# Patient Record
Sex: FEMALE | Race: WHITE | NOT HISPANIC OR LATINO
[De-identification: names, ages, dates, MRNs, and addresses within clinical notes are randomized per-mention and may not be internally consistent; named-entity substitution may affect disease eponyms.]

---

## 2018-04-09 ENCOUNTER — APPOINTMENT (OUTPATIENT)
Dept: SPINE | Facility: CLINIC | Age: 58
End: 2018-04-09
Payer: COMMERCIAL

## 2018-04-09 VITALS
HEIGHT: 65 IN | HEART RATE: 60 BPM | DIASTOLIC BLOOD PRESSURE: 91 MMHG | SYSTOLIC BLOOD PRESSURE: 140 MMHG | BODY MASS INDEX: 29.99 KG/M2 | WEIGHT: 180 LBS

## 2018-04-09 DIAGNOSIS — Z86.79 PERSONAL HISTORY OF OTHER DISEASES OF THE CIRCULATORY SYSTEM: ICD-10-CM

## 2018-04-09 DIAGNOSIS — Z87.898 PERSONAL HISTORY OF OTHER SPECIFIED CONDITIONS: ICD-10-CM

## 2018-04-09 DIAGNOSIS — M51.36 OTHER INTERVERTEBRAL DISC DEGENERATION, LUMBAR REGION: ICD-10-CM

## 2018-04-09 DIAGNOSIS — Z80.9 FAMILY HISTORY OF MALIGNANT NEOPLASM, UNSPECIFIED: ICD-10-CM

## 2018-04-09 DIAGNOSIS — Z83.3 FAMILY HISTORY OF DIABETES MELLITUS: ICD-10-CM

## 2018-04-09 DIAGNOSIS — M51.9 UNSPECIFIED THORACIC, THORACOLUMBAR AND LUMBOSACRAL INTERVERTEBRAL DISC DISORDER: ICD-10-CM

## 2018-04-09 DIAGNOSIS — Z82.49 FAMILY HISTORY OF ISCHEMIC HEART DISEASE AND OTHER DISEASES OF THE CIRCULATORY SYSTEM: ICD-10-CM

## 2018-04-09 DIAGNOSIS — F17.200 NICOTINE DEPENDENCE, UNSPECIFIED, UNCOMPLICATED: ICD-10-CM

## 2018-04-09 PROCEDURE — 99205 OFFICE O/P NEW HI 60 MIN: CPT

## 2018-04-09 RX ORDER — DULOXETINE HYDROCHLORIDE 30 MG/1
CAPSULE, DELAYED RELEASE ORAL
Refills: 0 | Status: ACTIVE | COMMUNITY

## 2018-04-16 ENCOUNTER — APPOINTMENT (OUTPATIENT)
Dept: RADIOLOGY | Facility: CLINIC | Age: 58
End: 2018-04-16

## 2018-04-16 ENCOUNTER — APPOINTMENT (OUTPATIENT)
Dept: CT IMAGING | Facility: CLINIC | Age: 58
End: 2018-04-16

## 2018-05-23 ENCOUNTER — RX RENEWAL (OUTPATIENT)
Age: 58
End: 2018-05-23

## 2018-06-01 ENCOUNTER — APPOINTMENT (OUTPATIENT)
Dept: SPINE | Facility: CLINIC | Age: 58
End: 2018-06-01
Payer: COMMERCIAL

## 2018-06-01 VITALS
WEIGHT: 180 LBS | HEART RATE: 71 BPM | BODY MASS INDEX: 29.99 KG/M2 | SYSTOLIC BLOOD PRESSURE: 115 MMHG | DIASTOLIC BLOOD PRESSURE: 79 MMHG | HEIGHT: 65 IN

## 2018-06-01 PROCEDURE — 99213 OFFICE O/P EST LOW 20 MIN: CPT

## 2018-06-05 ENCOUNTER — OUTPATIENT (OUTPATIENT)
Dept: OUTPATIENT SERVICES | Facility: HOSPITAL | Age: 58
LOS: 1 days | End: 2018-06-05
Payer: COMMERCIAL

## 2018-06-05 VITALS
DIASTOLIC BLOOD PRESSURE: 82 MMHG | HEIGHT: 64 IN | TEMPERATURE: 98 F | RESPIRATION RATE: 18 BRPM | OXYGEN SATURATION: 99 % | HEART RATE: 64 BPM | SYSTOLIC BLOOD PRESSURE: 122 MMHG | WEIGHT: 175.05 LBS

## 2018-06-05 DIAGNOSIS — M47.816 SPONDYLOSIS WITHOUT MYELOPATHY OR RADICULOPATHY, LUMBAR REGION: ICD-10-CM

## 2018-06-05 DIAGNOSIS — Z98.890 OTHER SPECIFIED POSTPROCEDURAL STATES: Chronic | ICD-10-CM

## 2018-06-05 DIAGNOSIS — Z01.818 ENCOUNTER FOR OTHER PREPROCEDURAL EXAMINATION: ICD-10-CM

## 2018-06-05 DIAGNOSIS — Z29.9 ENCOUNTER FOR PROPHYLACTIC MEASURES, UNSPECIFIED: ICD-10-CM

## 2018-06-05 DIAGNOSIS — F41.9 ANXIETY DISORDER, UNSPECIFIED: ICD-10-CM

## 2018-06-05 DIAGNOSIS — T41.45XA ADVERSE EFFECT OF UNSPECIFIED ANESTHETIC, INITIAL ENCOUNTER: ICD-10-CM

## 2018-06-05 LAB
ANION GAP SERPL CALC-SCNC: 12 MMOL/L — SIGNIFICANT CHANGE UP (ref 5–17)
BLD GP AB SCN SERPL QL: NEGATIVE — SIGNIFICANT CHANGE UP
BUN SERPL-MCNC: 22 MG/DL — SIGNIFICANT CHANGE UP (ref 7–23)
CALCIUM SERPL-MCNC: 9.8 MG/DL — SIGNIFICANT CHANGE UP (ref 8.4–10.5)
CHLORIDE SERPL-SCNC: 101 MMOL/L — SIGNIFICANT CHANGE UP (ref 96–108)
CO2 SERPL-SCNC: 26 MMOL/L — SIGNIFICANT CHANGE UP (ref 22–31)
CREAT SERPL-MCNC: 0.76 MG/DL — SIGNIFICANT CHANGE UP (ref 0.5–1.3)
GLUCOSE SERPL-MCNC: 91 MG/DL — SIGNIFICANT CHANGE UP (ref 70–99)
HCT VFR BLD CALC: 41.7 % — SIGNIFICANT CHANGE UP (ref 34.5–45)
HGB BLD-MCNC: 13.9 G/DL — SIGNIFICANT CHANGE UP (ref 11.5–15.5)
MCHC RBC-ENTMCNC: 30.1 PG — SIGNIFICANT CHANGE UP (ref 27–34)
MCHC RBC-ENTMCNC: 33.3 GM/DL — SIGNIFICANT CHANGE UP (ref 32–36)
MCV RBC AUTO: 90.3 FL — SIGNIFICANT CHANGE UP (ref 80–100)
MRSA PCR RESULT.: SIGNIFICANT CHANGE UP
PLATELET # BLD AUTO: 267 K/UL — SIGNIFICANT CHANGE UP (ref 150–400)
POTASSIUM SERPL-MCNC: 4.6 MMOL/L — SIGNIFICANT CHANGE UP (ref 3.5–5.3)
POTASSIUM SERPL-SCNC: 4.6 MMOL/L — SIGNIFICANT CHANGE UP (ref 3.5–5.3)
RBC # BLD: 4.62 M/UL — SIGNIFICANT CHANGE UP (ref 3.8–5.2)
RBC # FLD: 14.1 % — SIGNIFICANT CHANGE UP (ref 10.3–14.5)
RH IG SCN BLD-IMP: NEGATIVE — SIGNIFICANT CHANGE UP
S AUREUS DNA NOSE QL NAA+PROBE: SIGNIFICANT CHANGE UP
SODIUM SERPL-SCNC: 139 MMOL/L — SIGNIFICANT CHANGE UP (ref 135–145)
WBC # BLD: 4.82 K/UL — SIGNIFICANT CHANGE UP (ref 3.8–10.5)
WBC # FLD AUTO: 4.82 K/UL — SIGNIFICANT CHANGE UP (ref 3.8–10.5)

## 2018-06-05 PROCEDURE — 87641 MR-STAPH DNA AMP PROBE: CPT

## 2018-06-05 PROCEDURE — 86850 RBC ANTIBODY SCREEN: CPT

## 2018-06-05 PROCEDURE — 86901 BLOOD TYPING SEROLOGIC RH(D): CPT

## 2018-06-05 PROCEDURE — 87640 STAPH A DNA AMP PROBE: CPT

## 2018-06-05 PROCEDURE — G0463: CPT

## 2018-06-05 PROCEDURE — 85027 COMPLETE CBC AUTOMATED: CPT

## 2018-06-05 PROCEDURE — 80048 BASIC METABOLIC PNL TOTAL CA: CPT

## 2018-06-05 PROCEDURE — 86900 BLOOD TYPING SEROLOGIC ABO: CPT

## 2018-06-05 RX ORDER — CEFAZOLIN SODIUM 1 G
2000 VIAL (EA) INJECTION ONCE
Qty: 0 | Refills: 0 | Status: COMPLETED | OUTPATIENT
Start: 2018-06-12 | End: 2018-06-12

## 2018-06-05 RX ORDER — SODIUM CHLORIDE 9 MG/ML
3 INJECTION INTRAMUSCULAR; INTRAVENOUS; SUBCUTANEOUS EVERY 8 HOURS
Qty: 0 | Refills: 0 | Status: DISCONTINUED | OUTPATIENT
Start: 2018-06-12 | End: 2018-06-12

## 2018-06-05 RX ORDER — LIDOCAINE HCL 20 MG/ML
0.2 VIAL (ML) INJECTION ONCE
Qty: 0 | Refills: 0 | Status: COMPLETED | OUTPATIENT
Start: 2018-06-12 | End: 2018-06-12

## 2018-06-05 NOTE — H&P PST ADULT - PROBLEM SELECTOR PLAN 3
- case discuss with anesthesia ( Dr Andrade )  -will obtain records from 2011 ( [facility])  -pt will bring anesthesia records from [facility]

## 2018-06-05 NOTE — H&P PST ADULT - HISTORY OF PRESENT ILLNESS
57 year old female with history of  Anxiety, chronic low back pain s/p  L3-L4 lumbar laminectomy with removal of synovial cyst in 2018.  Pt. reports "stabbing" low back pain that radiates down the left leg with associated numbness and tingling.  She has received several epidural injections and had the lumbar cyst drained, without relief of pain. 57 year old female with history of  Anxiety, chronic low back pain s/p  L3-L4 lumbar laminectomy and fusion  in 2011. Pt has been c/o progressive  low back pain that radiates down the right leg with associated numbness and tingling for 3 moths followed by neurology tried pain medication without any relief s/p MRI revealed Junctional stenosis ,Grade 1 spondylolisthesis ,right synovial cyst . Presents to PST for scheduled Removal of Lumbar hardware  L2-5 decompression  L2- 3  L4 -5 posterior Lumbar Fusion  L2 - 5 Fusion on 6/12/2018.

## 2018-06-05 NOTE — H&P PST ADULT - PROBLEM SELECTOR PLAN 1
Removal of Lumbar hardware  L2-5 decompression  L2- 3  L4 -5 posterior Lumbar Fusion  L2 - 5 Fusion on 6/12/2018.  -Pre- Op instructions discussed  -labs sent

## 2018-06-05 NOTE — H&P PST ADULT - ACTIVITY
ADL's (activity limited over past 6 months due to pain) pt able walk ,daily activities, shopping ,light house work with back pain

## 2018-06-05 NOTE — H&P PST ADULT - ANESTHESIA, PREVIOUS REACTION, PROFILE
pt reports that she  had a anesthesia reaction with all the surgery (   couldn't breath, palpitations after anesthesia ) except t the knee surgery in last year at [place]/hypotension/irregular heartbeat/heart complications irregular heartbeat/pt reports that she has had a anesthesia reaction with all the surgery (   couldn't breath, palpitations after anesthesia ) except the knee surgery in last year at [place]/hypotension/heart complications irregular heartbeat/pt reports that she has had a anesthesia reaction with all the surgery (   couldn't breath, palpitations after anesthesia ) except the knee surgery in last year at [place]/hypotension

## 2018-06-05 NOTE — H&P PST ADULT - PSH
Excision of Scalp Cyst    H/O meniscectomy of right knee  2016  History of laminectomy  2011  Left Breast Cyst  1993- benign  Lumbar Synovial Cyst    S/P  Section  x3  S/P Dilatation and Curettage  1992 for miscarriage  S/P Laparoscopic Procedure  for Infertility

## 2018-06-05 NOTE — H&P PST ADULT - ASSESSMENT
CAPRINI SCORE [CLOT]    AGE RELATED RISK FACTORS                                                       MOBILITY RELATED FACTORS  x[ ] Age 41-60 years                                            (1 Point)                  [ ] Bed rest                                                        (1 Point)  [ ] Age: 61-74 years                                           (2 Points)                 [ ] Plaster cast                                                   (2 Points)  [ ] Age= 75 years                                              (3 Points)                 [ ] Bed bound for more than 72 hours                 (2 Points)    DISEASE RELATED RISK FACTORS                                               GENDER SPECIFIC FACTORS  [ ] Edema in the lower extremities                       (1 Point)                  [ ] Pregnancy                                                     (1 Point)  [ ] Varicose veins                                               (1 Point)                  [ ] Post-partum < 6 weeks                                   (1 Point)             [x ] BMI > 25 Kg/m2                                            (1 Point)                  [ ] Hormonal therapy  or oral contraception          (1 Point)                 [ ] Sepsis (in the previous month)                        (1 Point)                  [ ] History of pregnancy complications                 (1 point)  [ ] Pneumonia or serious lung disease                                               [ ] Unexplained or recurrent                     (1 Point)           (in the previous month)                               (1 Point)  [ ] Abnormal pulmonary function test                     (1 Point)                 SURGERY RELATED RISK FACTORS  [ ] Acute myocardial infarction                              (1 Point)                 [ ]  Section                                             (1 Point)  [ ] Congestive heart failure (in the previous month)  (1 Point)               [ ] Minor surgery                                                  (1 Point)   [ ] Inflammatory bowel disease                             (1 Point)                 [ ] Arthroscopic surgery                                        (2 Points)  [ ] Central venous access                                      (2 Points)                [x ] General surgery lasting more than 45 minutes   (2 Points)       [ ] Stroke (in the previous month)                          (5 Points)               [ ] Elective arthroplasty                                         (5 Points)                                                                                                                                               HEMATOLOGY RELATED FACTORS                                                 TRAUMA RELATED RISK FACTORS  [ ] Prior episodes of VTE                                     (3 Points)                 [ ] Fracture of the hip, pelvis, or leg                       (5 Points)  [ ] Positive family history for VTE                         (3 Points)                 [ ] Acute spinal cord injury (in the previous month)  (5 Points)  [ ] Prothrombin 18468 A                                     (3 Points)                 [ ] Paralysis  (less than 1 month)                             (5 Points)  [ ] Factor V Leiden                                             (3 Points)                  [ ] Multiple Trauma within 1 month                        (5 Points)  [ ] Lupus anticoagulants                                     (3 Points)                                                           [ ] Anticardiolipin antibodies                               (3 Points)                                                       [ ] High homocysteine in the blood                      (3 Points)                                             [ ] Other congenital or acquired thrombophilia      (3 Points)                                                [ ] Heparin induced thrombocytopenia                  (3 Points)                                          Total Score [    4      ]

## 2018-06-05 NOTE — H&P PST ADULT - PMH
Anxiety    Aortic Regurgitation  mild - as per last Echo -2016  Chronic Low Back Pain  since 10/2010  Lumbar Disc Disorder    MVP (Mitral Valve Prolapse)    Thyroid Nodule  benign- last thyroid BX 2010 Anxiety    Aortic Regurgitation  mild - as per last Echo -2016 -no change  Chronic Low Back Pain  since 10/2010  Lumbar Disc Disorder    MVP (Mitral Valve Prolapse)    Spondylosis without myelopathy or radiculopathy, lumbar region    Thyroid Nodule  benign- last thyroid BX 2014 follow endocrinologist  U/S every year

## 2018-06-05 NOTE — H&P PST ADULT - PSYCHIATRIC COMMENTS
Pt. anxious about surgery, all questions answered, emotional support provided. Pt. anxious about surgery, anesthesia, all questions answered, emotional support provided. pt will bring anesthesia  record from [facility]

## 2018-06-07 ENCOUNTER — RX RENEWAL (OUTPATIENT)
Age: 58
End: 2018-06-07

## 2018-06-08 ENCOUNTER — RX RENEWAL (OUTPATIENT)
Age: 58
End: 2018-06-08

## 2018-06-11 ENCOUNTER — TRANSCRIPTION ENCOUNTER (OUTPATIENT)
Age: 58
End: 2018-06-11

## 2018-06-12 ENCOUNTER — INPATIENT (INPATIENT)
Facility: HOSPITAL | Age: 58
LOS: 7 days | Discharge: HOME | DRG: 459 | End: 2018-06-20
Attending: NEUROLOGICAL SURGERY | Admitting: NEUROLOGICAL SURGERY
Payer: COMMERCIAL

## 2018-06-12 ENCOUNTER — APPOINTMENT (OUTPATIENT)
Dept: SPINE | Facility: HOSPITAL | Age: 58
End: 2018-06-12

## 2018-06-12 ENCOUNTER — RESULT REVIEW (OUTPATIENT)
Age: 58
End: 2018-06-12

## 2018-06-12 VITALS
SYSTOLIC BLOOD PRESSURE: 140 MMHG | DIASTOLIC BLOOD PRESSURE: 81 MMHG | TEMPERATURE: 98 F | HEIGHT: 64 IN | OXYGEN SATURATION: 99 % | HEART RATE: 59 BPM | RESPIRATION RATE: 16 BRPM | WEIGHT: 175.05 LBS

## 2018-06-12 DIAGNOSIS — M47.816 SPONDYLOSIS WITHOUT MYELOPATHY OR RADICULOPATHY, LUMBAR REGION: ICD-10-CM

## 2018-06-12 DIAGNOSIS — Z98.890 OTHER SPECIFIED POSTPROCEDURAL STATES: Chronic | ICD-10-CM

## 2018-06-12 LAB
ANION GAP SERPL CALC-SCNC: 11 MMOL/L — SIGNIFICANT CHANGE UP (ref 5–17)
ANION GAP SERPL CALC-SCNC: 11 MMOL/L — SIGNIFICANT CHANGE UP (ref 5–17)
ANION GAP SERPL CALC-SCNC: 14 MMOL/L — SIGNIFICANT CHANGE UP (ref 5–17)
BASOPHILS # BLD AUTO: 0 K/UL — SIGNIFICANT CHANGE UP (ref 0–0.2)
BASOPHILS NFR BLD AUTO: 0 % — SIGNIFICANT CHANGE UP (ref 0–2)
BUN SERPL-MCNC: 13 MG/DL — SIGNIFICANT CHANGE UP (ref 7–23)
BUN SERPL-MCNC: 14 MG/DL — SIGNIFICANT CHANGE UP (ref 7–23)
BUN SERPL-MCNC: 16 MG/DL — SIGNIFICANT CHANGE UP (ref 7–23)
CALCIUM SERPL-MCNC: 6.2 MG/DL — CRITICAL LOW (ref 8.4–10.5)
CALCIUM SERPL-MCNC: 6.7 MG/DL — LOW (ref 8.4–10.5)
CALCIUM SERPL-MCNC: 7.3 MG/DL — LOW (ref 8.4–10.5)
CHLORIDE SERPL-SCNC: 106 MMOL/L — SIGNIFICANT CHANGE UP (ref 96–108)
CHLORIDE SERPL-SCNC: 107 MMOL/L — SIGNIFICANT CHANGE UP (ref 96–108)
CHLORIDE SERPL-SCNC: 107 MMOL/L — SIGNIFICANT CHANGE UP (ref 96–108)
CO2 SERPL-SCNC: 21 MMOL/L — LOW (ref 22–31)
CO2 SERPL-SCNC: 23 MMOL/L — SIGNIFICANT CHANGE UP (ref 22–31)
CO2 SERPL-SCNC: 24 MMOL/L — SIGNIFICANT CHANGE UP (ref 22–31)
CREAT SERPL-MCNC: 0.67 MG/DL — SIGNIFICANT CHANGE UP (ref 0.5–1.3)
CREAT SERPL-MCNC: 0.68 MG/DL — SIGNIFICANT CHANGE UP (ref 0.5–1.3)
CREAT SERPL-MCNC: 0.77 MG/DL — SIGNIFICANT CHANGE UP (ref 0.5–1.3)
EOSINOPHIL # BLD AUTO: 0 K/UL — SIGNIFICANT CHANGE UP (ref 0–0.5)
EOSINOPHIL NFR BLD AUTO: 0.3 % — SIGNIFICANT CHANGE UP (ref 0–6)
GLUCOSE SERPL-MCNC: 144 MG/DL — HIGH (ref 70–99)
GLUCOSE SERPL-MCNC: 149 MG/DL — HIGH (ref 70–99)
GLUCOSE SERPL-MCNC: 193 MG/DL — HIGH (ref 70–99)
HCT VFR BLD CALC: 26.9 % — LOW (ref 34.5–45)
HCT VFR BLD CALC: 29.7 % — LOW (ref 34.5–45)
HCT VFR BLD CALC: 31.7 % — LOW (ref 34.5–45)
HGB BLD-MCNC: 10.6 G/DL — LOW (ref 11.5–15.5)
HGB BLD-MCNC: 11.1 G/DL — LOW (ref 11.5–15.5)
HGB BLD-MCNC: 9.4 G/DL — LOW (ref 11.5–15.5)
LYMPHOCYTES # BLD AUTO: 0.5 K/UL — LOW (ref 1–3.3)
LYMPHOCYTES # BLD AUTO: 4 % — LOW (ref 13–44)
MCHC RBC-ENTMCNC: 32 PG — SIGNIFICANT CHANGE UP (ref 27–34)
MCHC RBC-ENTMCNC: 32.1 PG — SIGNIFICANT CHANGE UP (ref 27–34)
MCHC RBC-ENTMCNC: 33 PG — SIGNIFICANT CHANGE UP (ref 27–34)
MCHC RBC-ENTMCNC: 34.9 GM/DL — SIGNIFICANT CHANGE UP (ref 32–36)
MCHC RBC-ENTMCNC: 35 GM/DL — SIGNIFICANT CHANGE UP (ref 32–36)
MCHC RBC-ENTMCNC: 35.7 GM/DL — SIGNIFICANT CHANGE UP (ref 32–36)
MCV RBC AUTO: 91.5 FL — SIGNIFICANT CHANGE UP (ref 80–100)
MCV RBC AUTO: 92 FL — SIGNIFICANT CHANGE UP (ref 80–100)
MCV RBC AUTO: 92.4 FL — SIGNIFICANT CHANGE UP (ref 80–100)
MONOCYTES # BLD AUTO: 0.1 K/UL — SIGNIFICANT CHANGE UP (ref 0–0.9)
MONOCYTES NFR BLD AUTO: 0.8 % — LOW (ref 2–14)
NEUTROPHILS # BLD AUTO: 11.4 K/UL — HIGH (ref 1.8–7.4)
NEUTROPHILS NFR BLD AUTO: 94.8 % — HIGH (ref 43–77)
PLATELET # BLD AUTO: 138 K/UL — LOW (ref 150–400)
PLATELET # BLD AUTO: 141 K/UL — LOW (ref 150–400)
PLATELET # BLD AUTO: 185 K/UL — SIGNIFICANT CHANGE UP (ref 150–400)
POTASSIUM SERPL-MCNC: 4 MMOL/L — SIGNIFICANT CHANGE UP (ref 3.5–5.3)
POTASSIUM SERPL-MCNC: 4.5 MMOL/L — SIGNIFICANT CHANGE UP (ref 3.5–5.3)
POTASSIUM SERPL-MCNC: 4.6 MMOL/L — SIGNIFICANT CHANGE UP (ref 3.5–5.3)
POTASSIUM SERPL-SCNC: 4 MMOL/L — SIGNIFICANT CHANGE UP (ref 3.5–5.3)
POTASSIUM SERPL-SCNC: 4.5 MMOL/L — SIGNIFICANT CHANGE UP (ref 3.5–5.3)
POTASSIUM SERPL-SCNC: 4.6 MMOL/L — SIGNIFICANT CHANGE UP (ref 3.5–5.3)
RBC # BLD: 2.94 M/UL — LOW (ref 3.8–5.2)
RBC # BLD: 3.21 M/UL — LOW (ref 3.8–5.2)
RBC # BLD: 3.44 M/UL — LOW (ref 3.8–5.2)
RBC # FLD: 12.8 % — SIGNIFICANT CHANGE UP (ref 10.3–14.5)
RBC # FLD: 12.8 % — SIGNIFICANT CHANGE UP (ref 10.3–14.5)
RBC # FLD: 12.9 % — SIGNIFICANT CHANGE UP (ref 10.3–14.5)
SODIUM SERPL-SCNC: 140 MMOL/L — SIGNIFICANT CHANGE UP (ref 135–145)
SODIUM SERPL-SCNC: 142 MMOL/L — SIGNIFICANT CHANGE UP (ref 135–145)
SODIUM SERPL-SCNC: 142 MMOL/L — SIGNIFICANT CHANGE UP (ref 135–145)
WBC # BLD: 10.5 K/UL — SIGNIFICANT CHANGE UP (ref 3.8–10.5)
WBC # BLD: 11.4 K/UL — HIGH (ref 3.8–10.5)
WBC # BLD: 12 K/UL — HIGH (ref 3.8–10.5)
WBC # FLD AUTO: 10.5 K/UL — SIGNIFICANT CHANGE UP (ref 3.8–10.5)
WBC # FLD AUTO: 11.4 K/UL — HIGH (ref 3.8–10.5)
WBC # FLD AUTO: 12 K/UL — HIGH (ref 3.8–10.5)

## 2018-06-12 PROCEDURE — 22614 ARTHRD PST TQ 1NTRSPC EA ADD: CPT | Mod: GC

## 2018-06-12 PROCEDURE — 63047 LAM FACETEC & FORAMOT LUMBAR: CPT | Mod: 59,GC

## 2018-06-12 PROCEDURE — 63267 EXCISE INTRSPINL LESION LMBR: CPT | Mod: 59,GC

## 2018-06-12 PROCEDURE — 22853 INSJ BIOMECHANICAL DEVICE: CPT | Mod: 59,GC

## 2018-06-12 PROCEDURE — 22842 INSERT SPINE FIXATION DEVICE: CPT | Mod: GC

## 2018-06-12 PROCEDURE — 88304 TISSUE EXAM BY PATHOLOGIST: CPT | Mod: 26

## 2018-06-12 PROCEDURE — 22633 ARTHRD CMBN 1NTRSPC LUMBAR: CPT | Mod: GC

## 2018-06-12 PROCEDURE — 99233 SBSQ HOSP IP/OBS HIGH 50: CPT

## 2018-06-12 PROCEDURE — 22634 ARTHRD CMBN 1NTRSPC EA ADDL: CPT | Mod: GC

## 2018-06-12 PROCEDURE — 63048 LAM FACETEC &FORAMOT EA ADDL: CPT | Mod: 59,GC

## 2018-06-12 RX ORDER — ENOXAPARIN SODIUM 100 MG/ML
40 INJECTION SUBCUTANEOUS EVERY 24 HOURS
Qty: 0 | Refills: 0 | Status: DISCONTINUED | OUTPATIENT
Start: 2018-06-13 | End: 2018-06-13

## 2018-06-12 RX ORDER — ONDANSETRON 8 MG/1
4 TABLET, FILM COATED ORAL EVERY 6 HOURS
Qty: 0 | Refills: 0 | Status: DISCONTINUED | OUTPATIENT
Start: 2018-06-12 | End: 2018-06-20

## 2018-06-12 RX ORDER — ACETAMINOPHEN 500 MG
1000 TABLET ORAL ONCE
Qty: 0 | Refills: 0 | Status: COMPLETED | OUTPATIENT
Start: 2018-06-12 | End: 2018-06-12

## 2018-06-12 RX ORDER — HYDROMORPHONE HYDROCHLORIDE 2 MG/ML
0.5 INJECTION INTRAMUSCULAR; INTRAVENOUS; SUBCUTANEOUS
Qty: 0 | Refills: 0 | Status: DISCONTINUED | OUTPATIENT
Start: 2018-06-12 | End: 2018-06-14

## 2018-06-12 RX ORDER — SODIUM CHLORIDE 9 MG/ML
500 INJECTION, SOLUTION INTRAVENOUS ONCE
Qty: 0 | Refills: 0 | Status: COMPLETED | OUTPATIENT
Start: 2018-06-12 | End: 2018-06-12

## 2018-06-12 RX ORDER — ASCORBIC ACID 60 MG
500 TABLET,CHEWABLE ORAL
Qty: 0 | Refills: 0 | Status: DISCONTINUED | OUTPATIENT
Start: 2018-06-12 | End: 2018-06-20

## 2018-06-12 RX ORDER — HYDROMORPHONE HYDROCHLORIDE 2 MG/ML
1 INJECTION INTRAMUSCULAR; INTRAVENOUS; SUBCUTANEOUS
Qty: 0 | Refills: 0 | Status: DISCONTINUED | OUTPATIENT
Start: 2018-06-12 | End: 2018-06-12

## 2018-06-12 RX ORDER — SODIUM CHLORIDE 9 MG/ML
1000 INJECTION INTRAMUSCULAR; INTRAVENOUS; SUBCUTANEOUS ONCE
Qty: 0 | Refills: 0 | Status: COMPLETED | OUTPATIENT
Start: 2018-06-12 | End: 2018-06-12

## 2018-06-12 RX ORDER — SENNA PLUS 8.6 MG/1
2 TABLET ORAL AT BEDTIME
Qty: 0 | Refills: 0 | Status: DISCONTINUED | OUTPATIENT
Start: 2018-06-12 | End: 2018-06-20

## 2018-06-12 RX ORDER — DEXTROSE MONOHYDRATE, SODIUM CHLORIDE, AND POTASSIUM CHLORIDE 50; .745; 4.5 G/1000ML; G/1000ML; G/1000ML
1000 INJECTION, SOLUTION INTRAVENOUS
Qty: 0 | Refills: 0 | Status: DISCONTINUED | OUTPATIENT
Start: 2018-06-12 | End: 2018-06-13

## 2018-06-12 RX ORDER — MAGNESIUM HYDROXIDE 400 MG/1
30 TABLET, CHEWABLE ORAL EVERY 12 HOURS
Qty: 0 | Refills: 0 | Status: DISCONTINUED | OUTPATIENT
Start: 2018-06-12 | End: 2018-06-20

## 2018-06-12 RX ORDER — DULOXETINE HYDROCHLORIDE 30 MG/1
1 CAPSULE, DELAYED RELEASE ORAL
Qty: 0 | Refills: 0 | COMMUNITY

## 2018-06-12 RX ORDER — CALCIUM GLUCONATE 100 MG/ML
1 VIAL (ML) INTRAVENOUS ONCE
Qty: 0 | Refills: 0 | Status: DISCONTINUED | OUTPATIENT
Start: 2018-06-12 | End: 2018-06-12

## 2018-06-12 RX ORDER — HYDROMORPHONE HYDROCHLORIDE 2 MG/ML
0.25 INJECTION INTRAMUSCULAR; INTRAVENOUS; SUBCUTANEOUS ONCE
Qty: 0 | Refills: 0 | Status: DISCONTINUED | OUTPATIENT
Start: 2018-06-12 | End: 2018-06-12

## 2018-06-12 RX ORDER — ACETAMINOPHEN 500 MG
650 TABLET ORAL EVERY 6 HOURS
Qty: 0 | Refills: 0 | Status: DISCONTINUED | OUTPATIENT
Start: 2018-06-12 | End: 2018-06-20

## 2018-06-12 RX ORDER — CALCIUM GLUCONATE 100 MG/ML
2 VIAL (ML) INTRAVENOUS ONCE
Qty: 0 | Refills: 0 | Status: COMPLETED | OUTPATIENT
Start: 2018-06-12 | End: 2018-06-12

## 2018-06-12 RX ORDER — DULOXETINE HYDROCHLORIDE 30 MG/1
60 CAPSULE, DELAYED RELEASE ORAL DAILY
Qty: 0 | Refills: 0 | Status: DISCONTINUED | OUTPATIENT
Start: 2018-06-12 | End: 2018-06-20

## 2018-06-12 RX ORDER — DIAZEPAM 5 MG
5 TABLET ORAL EVERY 6 HOURS
Qty: 0 | Refills: 0 | Status: DISCONTINUED | OUTPATIENT
Start: 2018-06-12 | End: 2018-06-13

## 2018-06-12 RX ORDER — NALOXONE HYDROCHLORIDE 4 MG/.1ML
0.1 SPRAY NASAL
Qty: 0 | Refills: 0 | Status: DISCONTINUED | OUTPATIENT
Start: 2018-06-12 | End: 2018-06-20

## 2018-06-12 RX ORDER — CEFAZOLIN SODIUM 1 G
2000 VIAL (EA) INJECTION EVERY 8 HOURS
Qty: 0 | Refills: 0 | Status: COMPLETED | OUTPATIENT
Start: 2018-06-12 | End: 2018-06-13

## 2018-06-12 RX ORDER — DIPHENHYDRAMINE HCL 50 MG
25 CAPSULE ORAL EVERY 4 HOURS
Qty: 0 | Refills: 0 | Status: DISCONTINUED | OUTPATIENT
Start: 2018-06-12 | End: 2018-06-20

## 2018-06-12 RX ORDER — DOCUSATE SODIUM 100 MG
100 CAPSULE ORAL THREE TIMES A DAY
Qty: 0 | Refills: 0 | Status: DISCONTINUED | OUTPATIENT
Start: 2018-06-12 | End: 2018-06-20

## 2018-06-12 RX ORDER — ONDANSETRON 8 MG/1
4 TABLET, FILM COATED ORAL
Qty: 0 | Refills: 0 | Status: DISCONTINUED | OUTPATIENT
Start: 2018-06-12 | End: 2018-06-13

## 2018-06-12 RX ORDER — HYDROMORPHONE HYDROCHLORIDE 2 MG/ML
30 INJECTION INTRAMUSCULAR; INTRAVENOUS; SUBCUTANEOUS
Qty: 0 | Refills: 0 | Status: DISCONTINUED | OUTPATIENT
Start: 2018-06-12 | End: 2018-06-14

## 2018-06-12 RX ADMIN — Medication 100 MILLIGRAM(S): at 22:39

## 2018-06-12 RX ADMIN — SODIUM CHLORIDE 1000 MILLILITER(S): 9 INJECTION, SOLUTION INTRAVENOUS at 16:44

## 2018-06-12 RX ADMIN — SENNA PLUS 2 TABLET(S): 8.6 TABLET ORAL at 22:39

## 2018-06-12 RX ADMIN — DEXTROSE MONOHYDRATE, SODIUM CHLORIDE, AND POTASSIUM CHLORIDE 100 MILLILITER(S): 50; .745; 4.5 INJECTION, SOLUTION INTRAVENOUS at 16:10

## 2018-06-12 RX ADMIN — HYDROMORPHONE HYDROCHLORIDE 30 MILLILITER(S): 2 INJECTION INTRAMUSCULAR; INTRAVENOUS; SUBCUTANEOUS at 20:07

## 2018-06-12 RX ADMIN — Medication 400 MILLIGRAM(S): at 15:30

## 2018-06-12 RX ADMIN — SODIUM CHLORIDE 3 MILLILITER(S): 9 INJECTION INTRAMUSCULAR; INTRAVENOUS; SUBCUTANEOUS at 07:22

## 2018-06-12 RX ADMIN — HYDROMORPHONE HYDROCHLORIDE 0.25 MILLIGRAM(S): 2 INJECTION INTRAMUSCULAR; INTRAVENOUS; SUBCUTANEOUS at 15:15

## 2018-06-12 RX ADMIN — Medication 5 MILLIGRAM(S): at 23:20

## 2018-06-12 RX ADMIN — HYDROMORPHONE HYDROCHLORIDE 30 MILLILITER(S): 2 INJECTION INTRAMUSCULAR; INTRAVENOUS; SUBCUTANEOUS at 16:08

## 2018-06-12 RX ADMIN — Medication 200 GRAM(S): at 19:30

## 2018-06-12 RX ADMIN — Medication 1000 MILLIGRAM(S): at 16:00

## 2018-06-12 RX ADMIN — Medication 100 MILLIGRAM(S): at 22:44

## 2018-06-12 RX ADMIN — Medication 200 GRAM(S): at 23:14

## 2018-06-12 RX ADMIN — SODIUM CHLORIDE 2000 MILLILITER(S): 9 INJECTION INTRAMUSCULAR; INTRAVENOUS; SUBCUTANEOUS at 17:42

## 2018-06-12 RX ADMIN — HYDROMORPHONE HYDROCHLORIDE 30 MILLILITER(S): 2 INJECTION INTRAMUSCULAR; INTRAVENOUS; SUBCUTANEOUS at 23:41

## 2018-06-12 RX ADMIN — HYDROMORPHONE HYDROCHLORIDE 0.25 MILLIGRAM(S): 2 INJECTION INTRAMUSCULAR; INTRAVENOUS; SUBCUTANEOUS at 15:30

## 2018-06-12 NOTE — PATIENT PROFILE ADULT. - PMH
Anxiety    Aortic Regurgitation  mild - as per last Echo -2016 -no change  Chronic Low Back Pain  since 10/2010  Lumbar Disc Disorder    MVP (Mitral Valve Prolapse)    Spondylosis without myelopathy or radiculopathy, lumbar region    Thyroid Nodule  benign- last thyroid BX 2014 follow endocrinologist  U/S every year

## 2018-06-12 NOTE — PROGRESS NOTE ADULT - SUBJECTIVE AND OBJECTIVE BOX
HPI:  57 year old female with history of  Anxiety, chronic low back pain s/p  L3-L4 lumbar laminectomy and fusion  in 2011. Pt has been c/o progressive  low back pain that radiates down the right leg with associated numbness and tingling for 3 moths followed by neurology tried pain medication without any relief s/p MRI revealed Junctional stenosis ,Grade 1 spondylolisthesis ,right synovial cyst .     6/12 s/p Removal of Lumbar hardware  L2-5 decompression  L2- 3  L4 -5 posterior Lumbar Fusion  L2 - 5 Fusion on     On admission, the patient was:  GCS: 15      VITALS:  T(C): , Max: 36.7 (06-12-18 @ 07:14)  HR:  (59 - 100)  BP:  (90/50 - 140/81)  ABP:  (81/43 - 137/74)  RR:  (15 - 18)  SpO2:  (95% - 100%)  Wt(kg): --      06-12-18 @ 07:01  -  06-12-18 @ 22:54  --------------------------------------------------------  IN: 1800 mL / OUT: 1635 mL / NET: 165 mL      LABS:  Na: 140 (06-12 @ 21:22), 142 (06-12 @ 16:48), 142 (06-12 @ 14:49)  K: 4.6 (06-12 @ 21:22), 4.0 (06-12 @ 16:48), 4.5 (06-12 @ 14:49)  Cl: 106 (06-12 @ 21:22), 107 (06-12 @ 16:48), 107 (06-12 @ 14:49)  CO2: 23 (06-12 @ 21:22), 24 (06-12 @ 16:48), 21 (06-12 @ 14:49)  BUN: 13 (06-12 @ 21:22), 14 (06-12 @ 16:48), 16 (06-12 @ 14:49)  Cr: 0.68 (06-12 @ 21:22), 0.67 (06-12 @ 16:48), 0.77 (06-12 @ 14:49)  Glu: 149(06-12 @ 21:22), 144(06-12 @ 16:48), 193(06-12 @ 14:49)    Hgb: 10.6 (06-12 @ 21:22), 9.4 (06-12 @ 16:48), 11.1 (06-12 @ 14:49)  Hct: 29.7 (06-12 @ 21:22), 26.9 (06-12 @ 16:48), 31.7 (06-12 @ 14:49)  WBC: 10.5 (06-12 @ 21:22), 12.0 (06-12 @ 16:48), 11.4 (06-12 @ 14:49)  Plt: 138 (06-12 @ 21:22), 141 (06-12 @ 16:48), 185 (06-12 @ 14:49)    INR:   PTT:     acetaminophen   Tablet 650 milliGRAM(s) Oral every 6 hours PRN  acetaminophen   Tablet. 650 milliGRAM(s) Oral every 6 hours PRN  ascorbic acid 500 milliGRAM(s) Oral two times a day  calcium gluconate IVPB 2 Gram(s) IV Intermittent once  ceFAZolin   IVPB 2000 milliGRAM(s) IV Intermittent every 8 hours  diazepam    Tablet 5 milliGRAM(s) Oral every 6 hours  diphenhydrAMINE   Capsule 25 milliGRAM(s) Oral every 4 hours PRN  docusate sodium 100 milliGRAM(s) Oral three times a day  DULoxetine 60 milliGRAM(s) Oral daily  HYDROmorphone PCA (1 mG/mL) 30 milliLiter(s) PCA Continuous PCA Continuous  HYDROmorphone PCA (1 mG/mL) Rescue Clinician Bolus 0.5 milliGRAM(s) IV Push every 15 minutes PRN  magnesium hydroxide Suspension 30 milliLiter(s) Oral every 12 hours PRN  multivitamin 1 Tablet(s) Oral daily  naloxone Injectable 0.1 milliGRAM(s) IV Push every 3 minutes PRN  ondansetron Injectable 4 milliGRAM(s) IV Push every 6 hours PRN  ondansetron Injectable 4 milliGRAM(s) IV Push every 30 minutes PRN  senna 2 Tablet(s) Oral at bedtime  sodium chloride 0.9% with potassium chloride 20 mEq/L 1000 milliLiter(s) IV Continuous <Continuous>      IMAGING:   Recent imaging studies were reviewed.    EXAMINATION:  General:  calm  HEENT:  MMM  Neuro:  awake, alert, oriented x 3, follows commands,  Pupils Equal and reactive, RUE 5/5,  LUE 5/5,  RLE 5/5,  LLE 5/5, no drift   Cards:  s1, s2 RRR  Respiratory:  lungs clear b/l   [place]:  soft  Extremities:  no edema  Skin:  warm/dry    DEVICES:   [] Restraints [] PIVs [] ET tube [] central line [] PICC [] arterial line [] richter [] NGT/OGT [] EVD [] LD [] TRAVIS/HMV [] LiCOX [] ICP monitor [] Trach [] PEG [] Chest Tube [] other:

## 2018-06-12 NOTE — PROGRESS NOTE ADULT - ASSESSMENT
ASSESSMENT/PLAN:  57 yo M s/p Removal of Lumbar hardware  L2-5 decompression  L2- 3  L4 -5 posterior Lumbar Fusion  L2 - 5 Fusion on     NEURO:    Neurochecks q1 hrs,    Dilaudid PCA for pain   Activity: [] mobilize as tolerated [] Bedrest [x] PT [x] OT [] PMNR    PULM:  O2 sat > 92%    CV:  SBP goal 100 - 160  MAP > 65    RENAL:  Fluids:  NS @ 75    GI:    Diet: advance diet as tolerated   GI prophylaxis [] not indicated [] PPI [] other:  Bowel regimen [x] colace [x] senna [] other:    ENDO:   Goal euglycemia (-180)    HEME/ONC:  H/H drop in OR - received 2 PRBC units intra-op, 1 PRBC post op - repeat post Transfusion H/H   recieved 2 gram Ca-gluconate IVPB, check ionized Ca  VTE prophylaxis: [] SCDs [x] chemoprophylaxis:  Lovenox to start tonight if H/H is stable [] hold chemoprophylaxis due to: [] high risk of DVT/PE on admission due to:    ID:  afebrile, zay-op antibiotics     SOCIAL/FAMILY:  [x] awaiting [] updated at bedside [] family meeting    CODE STATUS:  [x] Full Code [] DNR [] DNI [] Palliative/Comfort Care    DISPOSITION:  [x] ICU [] Stroke Unit [] Floor [] EMU [] RCU [] PCU    [x] Patient is at high risk of neurologic deterioration/death due to:  Lumbar surgery - with risk of hematoma     Time seen:  Time spent: ___ [] critical care minutes    Contact: [phone] ASSESSMENT/PLAN:  59 yo M s/p Removal of Lumbar hardware  L2-5 decompression  L2- 3  L4 -5 posterior Lumbar Fusion  L2 - 5 Fusion on     NEURO:    Neurochecks q1 hrs,    Dilaudid PCA for pain   Activity: [] mobilize as tolerated [] Bedrest [x] PT [x] OT [] PMNR    PULM:  O2 sat > 92%    CV:  SBP goal 100 - 160  MAP > 65    RENAL:  Fluids:  NS @ 75    GI:    Diet: advance diet as tolerated   GI prophylaxis [] not indicated [] PPI [] other:  Bowel regimen [x] colace [x] senna [] other:    ENDO:   Goal euglycemia (-180)    HEME/ONC:  H/H drop in OR - received 2 PRBC units intra-op, 1 PRBC post op - repeat post Transfusion H/H   recieved 2 gram Ca-gluconate IVPB, check ionized Ca  VTE prophylaxis: [x] SCDs [] chemoprophylaxis:   [] hold chemoprophylaxis due to: [] high risk of DVT/PE on admission due to:    ID:  afebrile, zay-op antibiotics     SOCIAL/FAMILY:  [] awaiting [x updated at bedside [] family meeting    CODE STATUS:  [x] Full Code [] DNR [] DNI [] Palliative/Comfort Care    DISPOSITION:  [] ICU [] Stroke Unit [x] Floor [] EMU [] RCU [] PCU    [] Patient is at high risk of neurologic deterioration/death due to:     Time seen:  Time spent: ___ [] critical care minutes    Contact: [phone]

## 2018-06-12 NOTE — BRIEF OPERATIVE NOTE - POST-OP DX
Spinal stenosis of lumbar region without neurogenic claudication  06/12/2018    Active  Rod Griffith

## 2018-06-12 NOTE — PATIENT PROFILE ADULT. - ANESTHESIA, PREVIOUS REACTION, PROFILE
hypotension/irregular heartbeat/pt reports that she has had a anesthesia reaction with all the surgery (   couldn't breath, palpitations after anesthesia ) except the knee surgery in last year at [place]

## 2018-06-12 NOTE — BRIEF OPERATIVE NOTE - PROCEDURE
<<-----Click on this checkbox to enter Procedure Lumbar fusion  06/12/2018  L2-S1 fusion, L4-S1 PLIF  Active  AGAMBLE

## 2018-06-13 ENCOUNTER — TRANSCRIPTION ENCOUNTER (OUTPATIENT)
Age: 58
End: 2018-06-13

## 2018-06-13 DIAGNOSIS — E83.51 HYPOCALCEMIA: ICD-10-CM

## 2018-06-13 DIAGNOSIS — D50.0 IRON DEFICIENCY ANEMIA SECONDARY TO BLOOD LOSS (CHRONIC): ICD-10-CM

## 2018-06-13 DIAGNOSIS — R22.0 LOCALIZED SWELLING, MASS AND LUMP, HEAD: ICD-10-CM

## 2018-06-13 DIAGNOSIS — I35.1 NONRHEUMATIC AORTIC (VALVE) INSUFFICIENCY: ICD-10-CM

## 2018-06-13 DIAGNOSIS — D69.6 THROMBOCYTOPENIA, UNSPECIFIED: ICD-10-CM

## 2018-06-13 DIAGNOSIS — E04.1 NONTOXIC SINGLE THYROID NODULE: ICD-10-CM

## 2018-06-13 LAB
ANION GAP SERPL CALC-SCNC: 10 MMOL/L — SIGNIFICANT CHANGE UP (ref 5–17)
APTT BLD: 24.2 SEC — LOW (ref 27.5–37.4)
BUN SERPL-MCNC: 12 MG/DL — SIGNIFICANT CHANGE UP (ref 7–23)
CA-I BLD-SCNC: 0.96 MMOL/L — LOW (ref 1.12–1.3)
CALCIUM SERPL-MCNC: 7.8 MG/DL — LOW (ref 8.4–10.5)
CHLORIDE SERPL-SCNC: 106 MMOL/L — SIGNIFICANT CHANGE UP (ref 96–108)
CO2 SERPL-SCNC: 24 MMOL/L — SIGNIFICANT CHANGE UP (ref 22–31)
CREAT ?TM UR-MCNC: 98 MG/DL — SIGNIFICANT CHANGE UP
CREAT SERPL-MCNC: 0.72 MG/DL — SIGNIFICANT CHANGE UP (ref 0.5–1.3)
GLUCOSE SERPL-MCNC: 133 MG/DL — HIGH (ref 70–99)
HCT VFR BLD CALC: 22.7 % — LOW (ref 34.5–45)
HCT VFR BLD CALC: 23.8 % — LOW (ref 34.5–45)
HCT VFR BLD CALC: 24.9 % — LOW (ref 34.5–45)
HCT VFR BLD CALC: 27.8 % — LOW (ref 34.5–45)
HGB BLD-MCNC: 8 G/DL — LOW (ref 11.5–15.5)
HGB BLD-MCNC: 8.2 G/DL — LOW (ref 11.5–15.5)
HGB BLD-MCNC: 8.8 G/DL — LOW (ref 11.5–15.5)
HGB BLD-MCNC: 9.9 G/DL — LOW (ref 11.5–15.5)
INR BLD: 1.16 RATIO — SIGNIFICANT CHANGE UP (ref 0.88–1.16)
MAGNESIUM SERPL-MCNC: 2.4 MG/DL — SIGNIFICANT CHANGE UP (ref 1.6–2.6)
MCHC RBC-ENTMCNC: 32.2 PG — SIGNIFICANT CHANGE UP (ref 27–34)
MCHC RBC-ENTMCNC: 33.3 PG — SIGNIFICANT CHANGE UP (ref 27–34)
MCHC RBC-ENTMCNC: 33.5 PG — SIGNIFICANT CHANGE UP (ref 27–34)
MCHC RBC-ENTMCNC: 33.5 PG — SIGNIFICANT CHANGE UP (ref 27–34)
MCHC RBC-ENTMCNC: 34.5 GM/DL — SIGNIFICANT CHANGE UP (ref 32–36)
MCHC RBC-ENTMCNC: 35.5 GM/DL — SIGNIFICANT CHANGE UP (ref 32–36)
MCHC RBC-ENTMCNC: 35.5 GM/DL — SIGNIFICANT CHANGE UP (ref 32–36)
MCHC RBC-ENTMCNC: 35.6 GM/DL — SIGNIFICANT CHANGE UP (ref 32–36)
MCV RBC AUTO: 93.2 FL — SIGNIFICANT CHANGE UP (ref 80–100)
MCV RBC AUTO: 93.7 FL — SIGNIFICANT CHANGE UP (ref 80–100)
MCV RBC AUTO: 94.4 FL — SIGNIFICANT CHANGE UP (ref 80–100)
MCV RBC AUTO: 94.5 FL — SIGNIFICANT CHANGE UP (ref 80–100)
PHOSPHATE SERPL-MCNC: 3.5 MG/DL — SIGNIFICANT CHANGE UP (ref 2.5–4.5)
PLATELET # BLD AUTO: 103 K/UL — LOW (ref 150–400)
PLATELET # BLD AUTO: 120 K/UL — LOW (ref 150–400)
PLATELET # BLD AUTO: 124 K/UL — LOW (ref 150–400)
PLATELET # BLD AUTO: 132 K/UL — LOW (ref 150–400)
POTASSIUM SERPL-MCNC: 4.6 MMOL/L — SIGNIFICANT CHANGE UP (ref 3.5–5.3)
POTASSIUM SERPL-SCNC: 4.6 MMOL/L — SIGNIFICANT CHANGE UP (ref 3.5–5.3)
PROT ?TM UR-MCNC: 7 MG/DL — SIGNIFICANT CHANGE UP (ref 0–12)
PROT/CREAT UR-RTO: 0.1 RATIO — SIGNIFICANT CHANGE UP (ref 0–0.2)
PROTHROM AB SERPL-ACNC: 12.7 SEC — SIGNIFICANT CHANGE UP (ref 9.8–12.7)
RBC # BLD: 2.4 M/UL — LOW (ref 3.8–5.2)
RBC # BLD: 2.55 M/UL — LOW (ref 3.8–5.2)
RBC # BLD: 2.64 M/UL — LOW (ref 3.8–5.2)
RBC # BLD: 2.96 M/UL — LOW (ref 3.8–5.2)
RBC # FLD: 12.9 % — SIGNIFICANT CHANGE UP (ref 10.3–14.5)
RBC # FLD: 12.9 % — SIGNIFICANT CHANGE UP (ref 10.3–14.5)
RBC # FLD: 13 % — SIGNIFICANT CHANGE UP (ref 10.3–14.5)
RBC # FLD: 13.1 % — SIGNIFICANT CHANGE UP (ref 10.3–14.5)
SODIUM SERPL-SCNC: 140 MMOL/L — SIGNIFICANT CHANGE UP (ref 135–145)
WBC # BLD: 10.3 K/UL — SIGNIFICANT CHANGE UP (ref 3.8–10.5)
WBC # BLD: 11.3 K/UL — HIGH (ref 3.8–10.5)
WBC # BLD: 11.8 K/UL — HIGH (ref 3.8–10.5)
WBC # BLD: 12.4 K/UL — HIGH (ref 3.8–10.5)
WBC # FLD AUTO: 10.3 K/UL — SIGNIFICANT CHANGE UP (ref 3.8–10.5)
WBC # FLD AUTO: 11.3 K/UL — HIGH (ref 3.8–10.5)
WBC # FLD AUTO: 11.8 K/UL — HIGH (ref 3.8–10.5)
WBC # FLD AUTO: 12.4 K/UL — HIGH (ref 3.8–10.5)

## 2018-06-13 PROCEDURE — 99223 1ST HOSP IP/OBS HIGH 75: CPT

## 2018-06-13 PROCEDURE — 71045 X-RAY EXAM CHEST 1 VIEW: CPT | Mod: 26

## 2018-06-13 RX ORDER — DIAZEPAM 5 MG
5 TABLET ORAL EVERY 6 HOURS
Qty: 0 | Refills: 0 | Status: DISCONTINUED | OUTPATIENT
Start: 2018-06-13 | End: 2018-06-14

## 2018-06-13 RX ORDER — CALCIUM GLUCONATE 100 MG/ML
1 VIAL (ML) INTRAVENOUS ONCE
Qty: 0 | Refills: 0 | Status: COMPLETED | OUTPATIENT
Start: 2018-06-13 | End: 2018-06-13

## 2018-06-13 RX ORDER — SODIUM CHLORIDE 9 MG/ML
1000 INJECTION INTRAMUSCULAR; INTRAVENOUS; SUBCUTANEOUS
Qty: 0 | Refills: 0 | Status: DISCONTINUED | OUTPATIENT
Start: 2018-06-13 | End: 2018-06-14

## 2018-06-13 RX ORDER — SODIUM CHLORIDE 9 MG/ML
500 INJECTION INTRAMUSCULAR; INTRAVENOUS; SUBCUTANEOUS ONCE
Qty: 0 | Refills: 0 | Status: COMPLETED | OUTPATIENT
Start: 2018-06-13 | End: 2018-06-13

## 2018-06-13 RX ORDER — DEXTROSE MONOHYDRATE, SODIUM CHLORIDE, AND POTASSIUM CHLORIDE 50; .745; 4.5 G/1000ML; G/1000ML; G/1000ML
1000 INJECTION, SOLUTION INTRAVENOUS
Qty: 0 | Refills: 0 | Status: DISCONTINUED | OUTPATIENT
Start: 2018-06-13 | End: 2018-06-13

## 2018-06-13 RX ORDER — ALPRAZOLAM 0.25 MG
0.5 TABLET ORAL THREE TIMES A DAY
Qty: 0 | Refills: 0 | Status: DISCONTINUED | OUTPATIENT
Start: 2018-06-13 | End: 2018-06-13

## 2018-06-13 RX ADMIN — DULOXETINE HYDROCHLORIDE 60 MILLIGRAM(S): 30 CAPSULE, DELAYED RELEASE ORAL at 11:46

## 2018-06-13 RX ADMIN — Medication 500 MILLIGRAM(S): at 05:58

## 2018-06-13 RX ADMIN — Medication 500 MILLIGRAM(S): at 17:00

## 2018-06-13 RX ADMIN — SENNA PLUS 2 TABLET(S): 8.6 TABLET ORAL at 19:52

## 2018-06-13 RX ADMIN — SODIUM CHLORIDE 2000 MILLILITER(S): 9 INJECTION INTRAMUSCULAR; INTRAVENOUS; SUBCUTANEOUS at 03:55

## 2018-06-13 RX ADMIN — Medication 5 MILLIGRAM(S): at 19:51

## 2018-06-13 RX ADMIN — Medication 100 MILLIGRAM(S): at 05:58

## 2018-06-13 RX ADMIN — Medication 100 MILLIGRAM(S): at 15:33

## 2018-06-13 RX ADMIN — Medication 1 TABLET(S): at 11:46

## 2018-06-13 RX ADMIN — Medication 5 MILLIGRAM(S): at 08:25

## 2018-06-13 RX ADMIN — SODIUM CHLORIDE 1000 MILLILITER(S): 9 INJECTION INTRAMUSCULAR; INTRAVENOUS; SUBCUTANEOUS at 04:30

## 2018-06-13 RX ADMIN — Medication 100 MILLIGRAM(S): at 19:52

## 2018-06-13 RX ADMIN — Medication 5 MILLIGRAM(S): at 15:33

## 2018-06-13 RX ADMIN — Medication 200 GRAM(S): at 16:53

## 2018-06-13 RX ADMIN — Medication 650 MILLIGRAM(S): at 22:50

## 2018-06-13 RX ADMIN — HYDROMORPHONE HYDROCHLORIDE 30 MILLILITER(S): 2 INJECTION INTRAMUSCULAR; INTRAVENOUS; SUBCUTANEOUS at 18:24

## 2018-06-13 NOTE — PHYSICAL THERAPY INITIAL EVALUATION ADULT - GENERAL OBSERVATIONS, REHAB EVAL
Pt received semi-supine in bed, (+) PCA pump, hemovac x2, richter. Pt alert, highly anxious, c/o feeling feverish, swollen t/o body; agreeable to PT eval with max encouragement from PT.

## 2018-06-13 NOTE — PROVIDER CONTACT NOTE (OTHER) - SITUATION
pt. BP noted to be 84/52.  pt. remains asymptomatic at this time.  L Hemovac drained of 200ml with R Hemovac drained of 50ml.  Mary Catheter output 1300ml

## 2018-06-13 NOTE — CONSULT NOTE ADULT - PROBLEM SELECTOR RECOMMENDATION 3
Likely related to PRBC transfusion (which contains Ca chelation as a preservative). Monitor BMP and iCa - keep in normal range, replete prn. Check this afternoon w/CBC. likely dilutional though pt did not receive "massive" transfusion - con't to monitor, if drops further would check coags, fibrinogen, hepatic function panel, LDH and hapto to r/o hemolytic causes incl transfusion related though less likely. Keep PLT > 50k

## 2018-06-13 NOTE — PHYSICAL THERAPY INITIAL EVALUATION ADULT - PLANNED THERAPY INTERVENTIONS, PT EVAL
bed mobility training/balance training/transfer training/gait training/GOAL: Pt will negotiate 12 stairs with 1 HR and step to pattern with stand by assist in 2 weeks./strengthening

## 2018-06-13 NOTE — PHYSICAL THERAPY INITIAL EVALUATION ADULT - ADDITIONAL COMMENTS
Pt lives with spouse and 2 sons in private home with 3-4 stairs to enter (+) HR, 1 flight to bedroom (+) HR.

## 2018-06-13 NOTE — CONSULT NOTE ADULT - SUBJECTIVE AND OBJECTIVE BOX
Authored by Dr Michael Pope [phone]     PMD: Nadine Laboy [phone] Neuro Jabier Woodard    Patient is a 58y old  Female who presents with a chief complaint of "I need surgery for my lower back". (2018 07:16)    HPI:  57 year old female with history of  Anxiety, chronic low back pain s/p  L3-L4 lumbar laminectomy and fusion  in . Pt has been c/o progressive  low back pain that radiates down the right leg with associated numbness and tingling for 3 moths followed by neurology tried pain medication without any relief s/p MRI revealed Junctional stenosis ,Grade 1 spondylolisthesis ,right synovial cyst . Presents to [facility] for scheduled Removal of Lumbar hardware  L2-5 decompression  L2- 3  L4 -5 posterior Lumbar Fusion  L2 - 5 Fusion on 2018. (2018 11:03)      History limited due to: [ ] Dementia  [ ] Delirium  [ ] Condition    Pain Symptoms if applicable:             	                         none	   mild         moderate         severe  Pain:	                            0	    1-3	     4-6	         7-10  Location:	  Modifying factors:	  Associated symptoms:	    Function: [ ] Independent  [ ] Assistance  [ ] Total care  [ ] Non-ambulatory    Allergies    No Known Allergies    Intolerances        HOME MEDICATIONS: [ ] Reviewed   Home Medications:  Cymbalta 60 mg oral delayed release capsule: 1 cap(s) orally once a day (2018 07:08)  oxyCODONE-acetaminophen 5 mg-325 mg oral tablet: 1 tab(s) orally every 6 hours, As Needed (2018 07:08)  Xanax 0.5 mg oral tablet: 1 tab(s) orally 3 times a day (2018 07:08)      MEDICATIONS  (STANDING):  ascorbic acid 500 milliGRAM(s) Oral two times a day  docusate sodium 100 milliGRAM(s) Oral three times a day  DULoxetine 60 milliGRAM(s) Oral daily  enoxaparin Injectable 40 milliGRAM(s) SubCutaneous every 24 hours  HYDROmorphone PCA (1 mG/mL) 30 milliLiter(s) PCA Continuous PCA Continuous  multivitamin 1 Tablet(s) Oral daily  senna 2 Tablet(s) Oral at bedtime  sodium chloride 0.9% with potassium chloride 20 mEq/L 1000 milliLiter(s) (100 mL/Hr) IV Continuous <Continuous>    MEDICATIONS  (PRN):  acetaminophen   Tablet 650 milliGRAM(s) Oral every 6 hours PRN For Temp greater than 38 C (100.4 F)  acetaminophen   Tablet. 650 milliGRAM(s) Oral every 6 hours PRN Mild Pain (1 - 3)  ALPRAZolam 0.5 milliGRAM(s) Oral three times a day PRN anxiety  diazepam    Tablet 5 milliGRAM(s) Oral every 8 hours PRN spasm  diphenhydrAMINE   Capsule 25 milliGRAM(s) Oral every 4 hours PRN Pruritus  HYDROmorphone PCA (1 mG/mL) Rescue Clinician Bolus 0.5 milliGRAM(s) IV Push every 15 minutes PRN for Pain Scale GREATER THAN 6  magnesium hydroxide Suspension 30 milliLiter(s) Oral every 12 hours PRN Constipation  naloxone Injectable 0.1 milliGRAM(s) IV Push every 3 minutes PRN For ANY of the following changes in patient status:  A. RR LESS THAN 10 breaths per minute, B. Oxygen saturation LESS THAN 90%, C. Sedation score of 6  ondansetron Injectable 4 milliGRAM(s) IV Push every 6 hours PRN Nausea      PAST MEDICAL & SURGICAL HISTORY:  Spondylosis without myelopathy or radiculopathy, lumbar region  Chronic Low Back Pain: since 10/2010  Aortic Regurgitation: mild - as per last Echo -2016 -no change  Anxiety  Lumbar Disc Disorder  Thyroid Nodule: benign- last thyroid BX  follow endocrinologist  U/S every year  MVP (Mitral Valve Prolapse)  H/O meniscectomy of right knee:   History of laminectomy:   Lumbar Synovial Cyst  Excision of Scalp Cyst:   S/P Laparoscopic Procedure: for Infertility  Left Breast Cyst: - benign  S/P Dilatation and Curettage:  for miscarriage  S/P  Section: x3  [ ] Reviewed     SOCIAL HISTORY:  Residence: [ ] [facility]  [ ] [facility]  [ ] Community  [ ] Substance abuse:   [ ] Tobacco:   [ ] Alcohol use:     FAMILY HISTORY:  [ ] No pertinent family history in first degree relatives     REVIEW OF SYSTEMS:    CONSTITUTIONAL: No fever, weight loss, or fatigue  EYES: No eye pain, visual disturbances, or discharge  ENMT:  No difficulty hearing, tinnitus, vertigo; No sinus or throat pain  NECK: No pain or stiffness  BREASTS: No pain, masses, or nipple discharge  RESPIRATORY: No cough, wheezing, chills or hemoptysis; No shortness of breath  CARDIOVASCULAR: No chest pain, palpitations, dizziness, or leg swelling  GASTROINTESTINAL: No abdominal or epigastric pain. No nausea, vomiting, or hematemesis; No diarrhea or constipation. No melena or hematochezia.  GENITOURINARY: No dysuria, frequency, hematuria, or incontinence  NEUROLOGICAL: No headaches, memory loss, loss of strength, numbness, or tremors  SKIN: No itching, burning, rashes, or lesions   LYMPH NODES: No enlarged glands  ENDOCRINE: No heat or cold intolerance; No hair loss  MUSCULOSKELETAL: No muscle or back pain  PSYCHIATRIC: No depression, anxiety, mood swings, or difficulty sleeping  HEME/LYMPH: No easy bruising, or bleeding gums  ALLERGY AND IMMUNOLOGIC: No hives or eczema    [  ] All other ROS negative  [  ] Unable to obtain due to poor mental status    Vital Signs Last 24 Hrs  T(C): 36.7 (2018 08:22), Max: 37.1 (2018 04:55)  T(F): 98 (2018 08:22), Max: 98.7 (2018 04:55)  HR: 104 (2018 08:22) (74 - 104)  BP: 109/66 (2018 08:22) (84/52 - 133/70)  BP(mean): 81 (2018 23:00) (63 - 98)  RR: 20 (2018 08:22) (15 - 20)  SpO2: 98% (2018 08:22) (95% - 100%)    PHYSICAL EXAM:    GENERAL: NAD, well-groomed, well-developed  HEAD:  Atraumatic, Normocephalic  EYES: EOMI, PERRLA, conjunctiva and sclera clear  ENMT: Moist mucous membranes  NECK: Supple, No JVD  RESPIRATORY: Clear to auscultation bilaterally; No rales, rhonchi, wheezing, or rubs  CARDIOVASCULAR: Regular rate and rhythm; No murmurs, rubs, or gallops  GASTROINTESTINAL: Soft, Nontender, Nondistended; Bowel sounds present  GENITOURINARY: Not examined  EXTREMITIES:  2+ Peripheral Pulses, No clubbing, cyanosis, or edema  NEURO:  Alert & Oriented X3; Moving all 4 extremities; No gross sensory deficits  HEME/LYMPH: No lymphadenopathy noted  SKIN: No rashes or lesions; Incisions C/D/I    LABS:                        8.8    11.8  )-----------( 120      ( 2018 05:58 )             24.9     06-13    140  |  106  |  12  ----------------------------<  133<H>  4.6   |  24  |  0.72    Ca    7.8<L>      2018 01:13  Phos  3.5       Mg     2.4           PT/INR - ( 2018 01:13 )   PT: 12.7 sec;   INR: 1.16 ratio         PTT - ( 2018 01:13 )  PTT:24.2 sec    CAPILLARY BLOOD GLUCOSE            RADIOLOGY & ADDITIONAL STUDIES:    EKG:   Personally Reviewed:  [ ] YES     Imaging:   Personally Reviewed:  [ ] YES               Consultant(s) notes reviewed:    Care Discussed with Consultant(s)/Other Providers:    Advanced Directives: [ ] DNR  [ ] No feeding tube  [ ] MOLST in chart  [ ] MOLST completed today  [ ] Unknown    [ ] Probable osteoporosis  [ ] Possible osteomalacia  [ ] Increased delirium risk  [ ] Delirium and other risks can be reduced by:          -early ambulation          -minimizing "tethers" - IV, oxygen, catheters, etc          -avoiding hypnotics and sedatives          -maintaining hydration/nutrition          -avoid anticholinergics - diphenhydramine, etc          -pain control          -supportive environment Authored by Dr Michael Pope [phone]     PMD: Nadine Laboy [phone] Neuro Jabier Woodard    Patient is a 58y old  Female who presents with a chief complaint of "I need surgery for my lower back". (2018 07:16)    HPI:  57 year old female with history of  Anxiety, chronic low back pain s/p  L3-L4 lumbar laminectomy and fusion  in . Pt has been c/o progressive  low back pain that radiates down the right leg with associated numbness and tingling for 3 moths followed by neurology tried pain medication without any relief s/p MRI revealed Junctional stenosis ,Grade 1 spondylolisthesis ,right synovial cyst . Pt now s/p scheduled Removal of Lumbar hardware  L2-5 decompression  L2- 3  L4 -5 posterior Lumbar Fusion  L2 - 5 Fusion on 2018. (2018 11:03). EBL 1500 cc, received 2 unit PRBC intraop and 1 unit PRBC in PACU. Hypotensive overnight, improved w/IVF resuscitation.       History limited due to: [ ] Dementia  [ ] Delirium  [ ] Condition    Pain Symptoms if applicable:             	                         none	   mild         moderate         severe  Pain:	                            0	    1-3	     4-6	         7-10  Location:	  Modifying factors:	  Associated symptoms:	    Function: [ ] Independent  [ ] Assistance  [ ] Total care  [ ] Non-ambulatory    Allergies    No Known Allergies    Intolerances    HOME MEDICATIONS: [x ] Reviewed - ISTOP Reference #: [id number]  Home Medications:  Cymbalta 60 mg oral delayed release capsule: 1 cap(s) orally once a day (2018 07:08)  oxyCODONE 5 m tab(s) orally every 6 hours, As Needed (2018 07:08)  Xanax 0.5 mg oral tablet: 1 tab(s) orally 3 times a day (2018 07:08)      MEDICATIONS  (STANDING):  ascorbic acid 500 milliGRAM(s) Oral two times a day  docusate sodium 100 milliGRAM(s) Oral three times a day  DULoxetine 60 milliGRAM(s) Oral daily  enoxaparin Injectable 40 milliGRAM(s) SubCutaneous every 24 hours  HYDROmorphone PCA (1 mG/mL) 30 milliLiter(s) PCA Continuous PCA Continuous  multivitamin 1 Tablet(s) Oral daily  senna 2 Tablet(s) Oral at bedtime  sodium chloride 0.9% with potassium chloride 20 mEq/L 1000 milliLiter(s) (100 mL/Hr) IV Continuous <Continuous>    MEDICATIONS  (PRN):  acetaminophen   Tablet 650 milliGRAM(s) Oral every 6 hours PRN For Temp greater than 38 C (100.4 F)  acetaminophen   Tablet. 650 milliGRAM(s) Oral every 6 hours PRN Mild Pain (1 - 3)  ALPRAZolam 0.5 milliGRAM(s) Oral three times a day PRN anxiety  diazepam    Tablet 5 milliGRAM(s) Oral every 8 hours PRN spasm  diphenhydrAMINE   Capsule 25 milliGRAM(s) Oral every 4 hours PRN Pruritus  HYDROmorphone PCA (1 mG/mL) Rescue Clinician Bolus 0.5 milliGRAM(s) IV Push every 15 minutes PRN for Pain Scale GREATER THAN 6  magnesium hydroxide Suspension 30 milliLiter(s) Oral every 12 hours PRN Constipation  naloxone Injectable 0.1 milliGRAM(s) IV Push every 3 minutes PRN For ANY of the following changes in patient status:  A. RR LESS THAN 10 breaths per minute, B. Oxygen saturation LESS THAN 90%, C. Sedation score of 6  ondansetron Injectable 4 milliGRAM(s) IV Push every 6 hours PRN Nausea      PAST MEDICAL & SURGICAL HISTORY:  Spondylosis without myelopathy or radiculopathy, lumbar region  Chronic Low Back Pain: since 10/2010  Aortic Regurgitation: mild - as per last Echo -2016 -no change  Anxiety  Lumbar Disc Disorder  Thyroid Nodule: benign- last thyroid BX 2014 follow endocrinologist  U/S every year  MVP (Mitral Valve Prolapse)  H/O meniscectomy of right knee: 2016  History of laminectomy:   Lumbar Synovial Cyst  Excision of Scalp Cyst:   S/P Laparoscopic Procedure: for Infertility  Left Breast Cyst: - benign  S/P Dilatation and Curettage:  for miscarriage  S/P  Section: x3  [ ] Reviewed     SOCIAL HISTORY:  Residence: [ ] [place]  [ ] SNF  [ ] Community  [ ] Substance abuse:   [ ] Tobacco: former  [ ] Alcohol use:     FAMILY HISTORY:  DM/HTN - Mother  Cancer - Father    REVIEW OF SYSTEMS:    CONSTITUTIONAL: No fever, weight loss, or fatigue  EYES: No eye pain, visual disturbances, or discharge  ENMT:  No difficulty hearing, tinnitus, vertigo; No sinus or throat pain  NECK: No pain or stiffness  BREASTS: No pain, masses, or nipple discharge  RESPIRATORY: No cough, wheezing, chills or hemoptysis; No shortness of breath  CARDIOVASCULAR: No chest pain, palpitations, dizziness, or leg swelling  GASTROINTESTINAL: No abdominal or epigastric pain. No nausea, vomiting, or hematemesis; No diarrhea or constipation. No melena or hematochezia.  GENITOURINARY: No dysuria, frequency, hematuria, or incontinence  NEUROLOGICAL: No headaches, memory loss, loss of strength, numbness, or tremors  SKIN: No itching, burning, rashes, or lesions   LYMPH NODES: No enlarged glands  ENDOCRINE: No heat or cold intolerance; No hair loss  MUSCULOSKELETAL: No muscle or back pain  PSYCHIATRIC: No depression, anxiety, mood swings, or difficulty sleeping  HEME/LYMPH: No easy bruising, or bleeding gums  ALLERGY AND IMMUNOLOGIC: No hives or eczema    [  ] All other ROS negative  [  ] Unable to obtain due to poor mental status    Vital Signs Last 24 Hrs  T(C): 36.7 (2018 08:22), Max: 37.1 (2018 04:55)  T(F): 98 (2018 08:22), Max: 98.7 (2018 04:55)  HR: 104 (2018 08:22) (74 - 104)  BP: 109/66 (2018 08:22) (84/52 - 133/70)  BP(mean): 81 (2018 23:00) (63 - 98)  RR: 20 (2018 08:22) (15 - 20)  SpO2: 98% (2018 08:22) (95% - 100%)    PHYSICAL EXAM:    GENERAL: NAD, well-groomed, well-developed  HEAD:  Atraumatic, Normocephalic  EYES: EOMI, PERRLA, conjunctiva and sclera clear  ENMT: Moist mucous membranes  NECK: Supple, No JVD  RESPIRATORY: Clear to auscultation bilaterally; No rales, rhonchi, wheezing, or rubs  CARDIOVASCULAR: Regular rate and rhythm; No murmurs, rubs, or gallops  GASTROINTESTINAL: Soft, Nontender, Nondistended; Bowel sounds present  GENITOURINARY: Not examined  EXTREMITIES:  2+ Peripheral Pulses, No clubbing, cyanosis, or edema  NEURO:  Alert & Oriented X3; Moving all 4 extremities; No gross sensory deficits  HEME/LYMPH: No lymphadenopathy noted  SKIN: No rashes or lesions; Incisions C/D/I    LABS:                        8.8    11.8  )-----------( 120      ( 2018 05:58 )             24.9         140  |  106  |  12  ----------------------------<  133<H>  4.6   |  24  |  0.72    Ca    7.8<L>      2018 01:13  Phos  3.5       Mg     2.4           PT/INR - ( 2018 01:13 )   PT: 12.7 sec;   INR: 1.16 ratio         PTT - ( 2018 01:13 )  PTT:24.2 sec    CAPILLARY BLOOD GLUCOSE            RADIOLOGY & ADDITIONAL STUDIES:    EKG:   Personally Reviewed:  [ ] YES     Imaging: Outpatient spinal imaging  Personally Reviewed:  [ ] YES               Consultant(s) notes reviewed:    Care Discussed with Consultant(s)/Other Providers:    Advanced Directives: [ ] DNR  [ ] No feeding tube  [ ] MOLST in chart  [ ] MOLST completed today  [ ] Unknown    [ ] Probable osteoporosis  [ ] Possible osteomalacia  [ ] Increased delirium risk  [ ] Delirium and other risks can be reduced by:          -early ambulation          -minimizing "tethers" - IV, oxygen, catheters, etc          -avoiding hypnotics and sedatives          -maintaining hydration/nutrition          -avoid anticholinergics - diphenhydramine, etc          -pain control          -supportive environment Authored by Dr Michael Pope [phone]     PMD: Nadine Lesterjose [phone] Neuro Jabier Woodard    Patient is a 58y old  Female who presents with a chief complaint of "I need surgery for my lower back". (2018 07:16)    HPI:  57 year old female with history of  Anxiety, chronic low back pain s/p  L3-L4 lumbar laminectomy and fusion  in . Pt has been c/o progressive  low back pain that radiates down the right leg with associated numbness and tingling for 3 moths followed by neurology tried pain medication without any relief s/p MRI revealed Junctional stenosis ,Grade 1 spondylolisthesis ,right synovial cyst . Pt now s/p scheduled Removal of Lumbar hardware  L2-5 decompression  L2- 3  L4 -5 posterior Lumbar Fusion  L2 - 5 Fusion on 2018. (2018 11:03). EBL 1500 cc, received 2 unit PRBC intraop and 1 unit PRBC in PACU. Hypotensive overnight, improved w/IVF resuscitation.     Presently pt has minimal pain in back, is concerned about feeling "tight" in skin of LE and trunk, face. She was dizzy earlier but now feels better.     Function: [x ] Independent  [ ] Assistance  [ ] Total care  [ ] Non-ambulatory    Allergies    No Known Allergies    Intolerances    HOME MEDICATIONS: [x ] Reviewed - ISTOP Reference #: [id number]  Home Medications:  Cymbalta 60 mg oral delayed release capsule: 1 cap(s) orally once a day (2018 07:08)  oxyCODONE 5 m tab(s) orally every 6 hours, As Needed (2018 07:08)  Xanax 0.5 mg oral tablet: 1 tab(s) orally 3 times a day (2018 07:08)  Mobic PRN    MEDICATIONS  (STANDING):  ascorbic acid 500 milliGRAM(s) Oral two times a day  docusate sodium 100 milliGRAM(s) Oral three times a day  DULoxetine 60 milliGRAM(s) Oral daily  enoxaparin Injectable 40 milliGRAM(s) SubCutaneous every 24 hours  HYDROmorphone PCA (1 mG/mL) 30 milliLiter(s) PCA Continuous PCA Continuous  multivitamin 1 Tablet(s) Oral daily  senna 2 Tablet(s) Oral at bedtime  sodium chloride 0.9% with potassium chloride 20 mEq/L 1000 milliLiter(s) (100 mL/Hr) IV Continuous <Continuous>    MEDICATIONS  (PRN):  acetaminophen   Tablet 650 milliGRAM(s) Oral every 6 hours PRN For Temp greater than 38 C (100.4 F)  acetaminophen   Tablet. 650 milliGRAM(s) Oral every 6 hours PRN Mild Pain (1 - 3)  ALPRAZolam 0.5 milliGRAM(s) Oral three times a day PRN anxiety  diazepam    Tablet 5 milliGRAM(s) Oral every 8 hours PRN spasm  diphenhydrAMINE   Capsule 25 milliGRAM(s) Oral every 4 hours PRN Pruritus  HYDROmorphone PCA (1 mG/mL) Rescue Clinician Bolus 0.5 milliGRAM(s) IV Push every 15 minutes PRN for Pain Scale GREATER THAN 6  magnesium hydroxide Suspension 30 milliLiter(s) Oral every 12 hours PRN Constipation  naloxone Injectable 0.1 milliGRAM(s) IV Push every 3 minutes PRN For ANY of the following changes in patient status:  A. RR LESS THAN 10 breaths per minute, B. Oxygen saturation LESS THAN 90%, C. Sedation score of 6  ondansetron Injectable 4 milliGRAM(s) IV Push every 6 hours PRN Nausea      PAST MEDICAL & SURGICAL HISTORY:  Spondylosis without myelopathy or radiculopathy, lumbar region  Chronic Low Back Pain: since 10/2010  Aortic Regurgitation: mild - as per last Echo -2016 -no change  Anxiety  Lumbar Disc Disorder  Thyroid Nodule: benign- last thyroid BX 2014 follow endocrinologist  U/S every year  MVP (Mitral Valve Prolapse)  H/O meniscectomy of right knee: 2016  History of laminectomy:   Lumbar Synovial Cyst  Excision of Scalp Cyst:   S/P Laparoscopic Procedure: for Infertility  Left Breast Cyst: - benign  S/P Dilatation and Curettage:  for miscarriage  S/P  Section: x3  [x ] Reviewed     SOCIAL HISTORY:  Residence: [ ] [facility]  [ ] SNF  [ x] Community  [ ] Substance abuse: none  [ ] Tobacco: former  [ ] Alcohol use: none    FAMILY HISTORY:  DM/HTN - Mother  Colorectal Cancer - Father    REVIEW OF SYSTEMS:    CONSTITUTIONAL: No fever, weight loss, or fatigue  EYES: No eye pain, visual disturbances, or discharge  ENMT:  + facial swelling  NECK: No pain or stiffness  BREASTS: No pain, masses, regular mammos are normal  RESPIRATORY: No cough, wheezing, chills or hemoptysis; No shortness of breath  CARDIOVASCULAR: No chest pain, palpitations, dizziness, or leg swelling  GASTROINTESTINAL: No abdominal or epigastric pain. No nausea, vomiting, or hematemesis; No diarrhea or constipation. No melena or hematochezia.  GENITOURINARY: No dysuria, frequency, hematuria, or incontinence. feels richter  NEUROLOGICAL: No headaches, memory loss, loss of strength, numbness, or tremors  SKIN: No itching, burning, rashes, or lesions   ENDOCRINE: thyroid nodule, recent normal thyroid testing  MUSCULOSKELETAL: + mild back pain  PSYCHIATRIC: +anxiety  HEME/LYMPH: No easy bruising, or bleeding gums    [  ] All other ROS negative  [  ] Unable to obtain due to poor mental status    Vital Signs Last 24 Hrs  T(C): 36.7 (2018 08:22), Max: 37.1 (2018 04:55)  T(F): 98 (2018 08:22), Max: 98.7 (2018 04:55)  HR: 104 (2018 08:22) (74 - 104)  BP: 109/66 (2018 08:22) (84/52 - 133/70)  BP(mean): 81 (2018 23:00) (63 - 98)  RR: 20 (2018 08:22) (15 - 20)  SpO2: 98% (2018 08:22) (95% - 100%)    PHYSICAL EXAM:    GENERAL: NAD, anxious   HEAD:  Atraumatic, Normocephalic  EYES: EOMI, PERRLA, conjunctiva and sclera clear + periorbital edema  ENMT: Moist mucous membranes + facial flushing  NECK: Supple, No JVD  RESPIRATORY: Clear to auscultation bilaterally; No rales, rhonchi, wheezing, or rubs  CARDIOVASCULAR: Regular rate and rhythm; No murmurs, rubs, or gallops  GASTROINTESTINAL: Soft, Nontender, Nondistended; Bowel sounds present  GENITOURINARY: + richter  EXTREMITIES:  2+ Peripheral Pulses, No clubbing, cyanosis, or edema  NEURO:  Alert & Oriented X3; Moving all 4 extremities; No gross sensory deficits  HEME/LYMPH: No lymphadenopathy noted  SKIN: Dependent pitting and nonpitting edema b/l LE 1+ most prominent in thighs. facial flushing + HMV x 2 originating out of lumbar surgical incision. surgical incision is clean, covered.     LABS:                        8.8    11.8  )-----------( 120      ( 2018 05:58 )             24.9     06-13    140  |  106  |  12  ----------------------------<  133<H>  4.6   |  24  |  0.72    Ca    7.8<L>      2018 01:13  Phos  3.5       Mg     2.4     -13      PT/INR - ( 2018 01:13 )   PT: 12.7 sec;   INR: 1.16 ratio         PTT - ( 2018 01:13 )  PTT:24.2 sec    CAPILLARY BLOOD GLUCOSE            RADIOLOGY & ADDITIONAL STUDIES:    Imaging: Outpatient spinal imaging  Personally Reviewed:  [ ] YES               Consultant(s) notes reviewed:    Care Discussed with Consultant(s)/Other Providers:

## 2018-06-13 NOTE — PHYSICAL THERAPY INITIAL EVALUATION ADULT - PERTINENT HX OF CURRENT PROBLEM, REHAB EVAL
57 year old female with history of  Anxiety, chronic low back pain s/p  L3-L4 lumbar laminectomy and fusion  in 2011. Pt has been c/o progressive  low back pain that radiates down the right leg with associated numbness and tingling for 3 months. Presents to PST for scheduled Removal of Lumbar hardware  L2-5 decompression  L2- 3  L4 -5 posterior Lumbar Fusion  L2 - 5 Fusion on 6/12/2018 57 year old female with history of Anxiety, chronic low back pain s/p  L3-L4 lumbar laminectomy and fusion  in 2011. Pt has been c/o progressive  low back pain that radiates down the right leg with associated numbness and tingling for 3 months. Presents to PST for scheduled Removal of Lumbar hardware  L2-5 decompression  L2- 3  L4 -5 posterior Lumbar Fusion  L2 - 5 Fusion on 6/12/2018

## 2018-06-13 NOTE — PROGRESS NOTE ADULT - SUBJECTIVE AND OBJECTIVE BOX
SUBJECTIVE: Pt reports feeling anxious this morning, bilateral periorbital edema, pt reports her hands and feet feel swollen, well controlled back pain    OVERNIGHT EVENTS: none    Vital Signs Last 24 Hrs  T(C): 36.7 (13 Jun 2018 08:22), Max: 37.1 (13 Jun 2018 04:55)  T(F): 98 (13 Jun 2018 08:22), Max: 98.7 (13 Jun 2018 04:55)  HR: 84 (13 Jun 2018 10:53) (74 - 104)  BP: 90/56 (13 Jun 2018 10:53) (84/52 - 133/70)  BP(mean): 81 (12 Jun 2018 23:00) (63 - 98)  RR: 20 (13 Jun 2018 08:22) (15 - 20)  SpO2: 96% (13 Jun 2018 10:53) (95% - 100%)    PHYSICAL EXAM:    General: No Acute Distress     Neurological: Awake, alert oriented to person, place and time, Following Commands, PERRL, EOMI, Face Symmetrical, Speech Fluent, Moving all extremities, Muscle Strength normal in all four extremities, No Drift, Sensation to Light Touch Intact    Pulmonary: Clear to Auscultation, No Rales, No Rhonchi, No Wheezes     Cardiovascular: S1, S2, Regular Rate and Rhythm     Gastrointestinal: Soft, Nontender, Nondistended     Incision: back dressing c/d/i    LABS:                        8.8    11.8  )-----------( 120      ( 13 Jun 2018 05:58 )             24.9    06-13    140  |  106  |  12  ----------------------------<  133<H>  4.6   |  24  |  0.72    Ca    7.8<L>      13 Jun 2018 01:13  Phos  3.5     06-13  Mg     2.4     06-13    PT/INR - ( 13 Jun 2018 01:13 )   PT: 12.7 sec;   INR: 1.16 ratio         PTT - ( 13 Jun 2018 01:13 )  PTT:24.2 sec      06-12 @ 07:01  -  06-13 @ 07:00  --------------------------------------------------------  IN: 3030 mL / OUT: 3395 mL / NET: -365 mL    06-13 @ 07:01 - 06-13 @ 11:19  --------------------------------------------------------  IN: 410 mL / OUT: 1700 mL / NET: -1290 mL      DRAINS: LT HMV (410cc/24 hrs), RT HMV (185cc/24hrs)    MEDICATIONS:  Antibiotics:    Neuro:  acetaminophen   Tablet 650 milliGRAM(s) Oral every 6 hours PRN For Temp greater than 38 C (100.4 F)  acetaminophen   Tablet. 650 milliGRAM(s) Oral every 6 hours PRN Mild Pain (1 - 3)  ALPRAZolam 0.5 milliGRAM(s) Oral three times a day PRN anxiety  diazepam    Tablet 5 milliGRAM(s) Oral every 8 hours PRN spasm  diphenhydrAMINE   Capsule 25 milliGRAM(s) Oral every 4 hours PRN Pruritus  DULoxetine 60 milliGRAM(s) Oral daily  HYDROmorphone PCA (1 mG/mL) 30 milliLiter(s) PCA Continuous PCA Continuous  HYDROmorphone PCA (1 mG/mL) Rescue Clinician Bolus 0.5 milliGRAM(s) IV Push every 15 minutes PRN for Pain Scale GREATER THAN 6  ondansetron Injectable 4 milliGRAM(s) IV Push every 6 hours PRN Nausea    Cardiac:    Pulm:    GI/:  docusate sodium 100 milliGRAM(s) Oral three times a day  magnesium hydroxide Suspension 30 milliLiter(s) Oral every 12 hours PRN Constipation  senna 2 Tablet(s) Oral at bedtime    Other:   ascorbic acid 500 milliGRAM(s) Oral two times a day  multivitamin 1 Tablet(s) Oral daily  naloxone Injectable 0.1 milliGRAM(s) IV Push every 3 minutes PRN For ANY of the following changes in patient status:  A. RR LESS THAN 10 breaths per minute, B. Oxygen saturation LESS THAN 90%, C. Sedation score of 6  sodium chloride 0.9% with potassium chloride 20 mEq/L 1000 milliLiter(s) IV Continuous <Continuous>    DIET: [x] Regular [] CCD [] Renal [] Puree [] Dysphagia [] Tube Feeds:     IMAGING:

## 2018-06-13 NOTE — PROGRESS NOTE ADULT - SUBJECTIVE AND OBJECTIVE BOX
Day 1 of Anesthesia Pain Management Service    SUBJECTIVE: Patient is doing well with IV PCA    Pain Scale Score:	[X] Refer to charted pain scores    THERAPY:    [ ] IV PCA Morphine		[ ] 5 mg/mL	[ ] 1 mg/mL  [X] IV PCA Hydromorphone	[ ] 5 mg/mL	[X] 1 mg/mL  [ ] IV PCA Fentanyl		[ ] 50 micrograms/mL    Demand dose: 0.2 mg     Lockout: 6 minutes   Continuous Rate: 0 mg/hr  4 Hour Limit: 4 mg    MEDICATIONS  (STANDING):  ascorbic acid 500 milliGRAM(s) Oral two times a day  docusate sodium 100 milliGRAM(s) Oral three times a day  DULoxetine 60 milliGRAM(s) Oral daily  enoxaparin Injectable 40 milliGRAM(s) SubCutaneous every 24 hours  HYDROmorphone PCA (1 mG/mL) 30 milliLiter(s) PCA Continuous PCA Continuous  multivitamin 1 Tablet(s) Oral daily  senna 2 Tablet(s) Oral at bedtime  sodium chloride 0.9% with potassium chloride 20 mEq/L 1000 milliLiter(s) (100 mL/Hr) IV Continuous <Continuous>    MEDICATIONS  (PRN):  acetaminophen   Tablet 650 milliGRAM(s) Oral every 6 hours PRN For Temp greater than 38 C (100.4 F)  acetaminophen   Tablet. 650 milliGRAM(s) Oral every 6 hours PRN Mild Pain (1 - 3)  ALPRAZolam 0.5 milliGRAM(s) Oral three times a day PRN anxiety  diazepam    Tablet 5 milliGRAM(s) Oral every 8 hours PRN spasm  diphenhydrAMINE   Capsule 25 milliGRAM(s) Oral every 4 hours PRN Pruritus  HYDROmorphone PCA (1 mG/mL) Rescue Clinician Bolus 0.5 milliGRAM(s) IV Push every 15 minutes PRN for Pain Scale GREATER THAN 6  magnesium hydroxide Suspension 30 milliLiter(s) Oral every 12 hours PRN Constipation  naloxone Injectable 0.1 milliGRAM(s) IV Push every 3 minutes PRN For ANY of the following changes in patient status:  A. RR LESS THAN 10 breaths per minute, B. Oxygen saturation LESS THAN 90%, C. Sedation score of 6  ondansetron Injectable 4 milliGRAM(s) IV Push every 6 hours PRN Nausea      OBJECTIVE:    Sedation Score:	[ X] Alert	[ ] Drowsy 	[ ] Arousable	[ ] Asleep	[ ] Unresponsive    Side Effects:	[X ] None	[ ] Nausea	[ ] Vomiting	[ ] Pruritus  		[ ] Other:    Vital Signs Last 24 Hrs  T(C): 36.7 (13 Jun 2018 08:22), Max: 37.1 (13 Jun 2018 04:55)  T(F): 98 (13 Jun 2018 08:22), Max: 98.7 (13 Jun 2018 04:55)  HR: 104 (13 Jun 2018 08:22) (74 - 104)  BP: 109/66 (13 Jun 2018 08:22) (84/52 - 133/70)  BP(mean): 81 (12 Jun 2018 23:00) (63 - 98)  RR: 20 (13 Jun 2018 08:22) (15 - 20)  SpO2: 98% (13 Jun 2018 08:22) (95% - 100%)    ASSESSMENT/ PLAN    Therapy to  be:               [X] Continued   [ ] Discontinued   [ ] Changed to PRN Analgesics    Documentation and Verification of current medications:   [X] Done	[ ] Not done, not eligible    Comments: Using 1-7x/hr. Transition to oral analgesics when OOB and tolerating po.

## 2018-06-13 NOTE — CONSULT NOTE ADULT - PROBLEM SELECTOR RECOMMENDATION 4
post op care incl PCA pump per NSGY, pain service Likely related to PRBC transfusion (which contains Ca chelation as a preservative). Monitor BMP and iCa - keep in normal range, replete prn. Check this afternoon w/CBC.

## 2018-06-13 NOTE — PROGRESS NOTE ADULT - ASSESSMENT
57 year old female with history of  Anxiety, chronic low back pain s/p  L3-L4 lumbar laminectomy and fusion  in 2011. Pt has been c/o progressive  low back pain that radiates down the right leg with associated numbness and tingling for 3 moths followed by neurology tried pain medication without any relief s/p MRI revealed Junctional stenosis ,Grade 1 spondylolisthesis ,right synovial cyst . Presents to PST for scheduled Removal of Lumbar hardware  L2-5 decompression  L2- 3  L4 -5 posterior Lumbar Fusion  L2 - 5 Fusion on 6/12/2018.     PROCEDURE: 6/12 s/p L4-S1 Posterior lateral interbody fusion, extension of fusion to L2, resection of synovial cyst, 1500 ml blood loss, s/p 3 u PRBC (2u intra-op, 1u post op), pt remained in the NSCU for close monitoring of blood pressure requiring multiple fluid boluses     POD#1    PLAN:  Neuro:   - maintain HMV's-  LT HMV (410cc/24 hrs), RT HMV (185cc/24hrs)  - monitor H/H-   - periorbital edema- fluid overload- dc IVF, monitor BP, dc richter later today,   - monitor I's and O's  - monitor Ca- supplement as needed  - pain control- continue PCA, valium for spasm  - anxiety- continue cymbalta, (home dose xanax 0.5 bid)  Respiratory:   - encouraged incentive spirometry  CV:  - vital signs stable  DVT ppx:   - venodynes,   - hold chemoprophylaxis due to high drainage output, acute blood loss   PT/OT:   - TBD      Spectralink # [id number]

## 2018-06-13 NOTE — CONSULT NOTE ADULT - ASSESSMENT
57F w Anxiety, chronic low back pain s/p  L3-L4 lumbar laminectomy and fusion  in 2011, thyroid nodule, ?aortic valve insufficiency a/w progressive low back pain that radiates down the right leg now s/p removal of lumbar hardware  L2-5 decompression  L2- 3  L4 -5 posterior lumbar fusion  L2 - 5 Fusion c/b blood loss anemia w/hypotension.

## 2018-06-13 NOTE — CONSULT NOTE ADULT - PROBLEM SELECTOR RECOMMENDATION 6
c/w Benzo PRN anxiety, SNRI - discussed w/NSGY concern about double dosing Xanax for anxiety and Valium for back spasm - would switch to one or other, monitor for sedation

## 2018-06-13 NOTE — CONSULT NOTE ADULT - PROBLEM SELECTOR RECOMMENDATION 9
Pt w/significant zay- and post-operative bleeding resulting in hypotension - component of hemorrhagic shock, s/p 3 units PRBC - transfuse prn for symptomatic anemia and hemodynamic instability. CBC Q6, vitals Q4hr. If hemodynamically stable keep Hb > 7. Monitor coags for dilutional coagulopathy and replete prn. would hold Lovenox until we are confident bleeding has improved

## 2018-06-13 NOTE — CONSULT NOTE ADULT - PROBLEM SELECTOR RECOMMENDATION 2
likely dilutional though pt did not receive "massive" transfusion - con't to monitor, if drops further would check coags, fibrinogen, hepatic function panel, LDH and hapto to r/o hemolytic causes though much less likely. Keep PLT > 50k likely transfusion related volume overload, no other symptoms of overload. Given periorbital prominence of edema would check urine protein creatinine ratio. likely IVF and transfusion related volume overload, no other symptoms of overload. Given periorbital prominence of edema would check urine protein creatinine ratio.  Agree w/d/c maintenance IVF and resuscitate via bolus and blood products as indicated.

## 2018-06-13 NOTE — CONSULT NOTE ADULT - PROBLEM SELECTOR RECOMMENDATION 5
c/w Benzo PRN anxiety, SNRI - discussed w/NSGY concern about double dosing Xanax for anxiety and Valium for back spasm - would switch to one or other, monitor for sedation post op care incl PCA pump per NSGY, pain service

## 2018-06-14 DIAGNOSIS — R50.9 FEVER, UNSPECIFIED: ICD-10-CM

## 2018-06-14 LAB
ALBUMIN SERPL ELPH-MCNC: 2.5 G/DL — LOW (ref 3.3–5)
ALP SERPL-CCNC: 43 U/L — SIGNIFICANT CHANGE UP (ref 40–120)
ALT FLD-CCNC: 21 U/L — SIGNIFICANT CHANGE UP (ref 10–45)
ANION GAP SERPL CALC-SCNC: 9 MMOL/L — SIGNIFICANT CHANGE UP (ref 5–17)
APPEARANCE UR: CLEAR — SIGNIFICANT CHANGE UP
AST SERPL-CCNC: 31 U/L — SIGNIFICANT CHANGE UP (ref 10–40)
BASOPHILS # BLD AUTO: 0.02 K/UL — SIGNIFICANT CHANGE UP (ref 0–0.2)
BASOPHILS NFR BLD AUTO: 0.2 % — SIGNIFICANT CHANGE UP (ref 0–2)
BILIRUB DIRECT SERPL-MCNC: 0.1 MG/DL — SIGNIFICANT CHANGE UP (ref 0–0.2)
BILIRUB INDIRECT FLD-MCNC: 0.4 MG/DL — SIGNIFICANT CHANGE UP (ref 0.2–1)
BILIRUB SERPL-MCNC: 0.5 MG/DL — SIGNIFICANT CHANGE UP (ref 0.2–1.2)
BILIRUB UR-MCNC: NEGATIVE — SIGNIFICANT CHANGE UP
BLD GP AB SCN SERPL QL: NEGATIVE — SIGNIFICANT CHANGE UP
BUN SERPL-MCNC: 11 MG/DL — SIGNIFICANT CHANGE UP (ref 7–23)
CA-I BLD-SCNC: 1.12 MMOL/L — SIGNIFICANT CHANGE UP (ref 1.12–1.3)
CALCIUM SERPL-MCNC: 7.4 MG/DL — LOW (ref 8.4–10.5)
CHLORIDE SERPL-SCNC: 103 MMOL/L — SIGNIFICANT CHANGE UP (ref 96–108)
CO2 SERPL-SCNC: 27 MMOL/L — SIGNIFICANT CHANGE UP (ref 22–31)
COLOR SPEC: SIGNIFICANT CHANGE UP
CREAT SERPL-MCNC: 0.79 MG/DL — SIGNIFICANT CHANGE UP (ref 0.5–1.3)
DIFF PNL FLD: NEGATIVE — SIGNIFICANT CHANGE UP
EOSINOPHIL # BLD AUTO: 0.07 K/UL — SIGNIFICANT CHANGE UP (ref 0–0.5)
EOSINOPHIL NFR BLD AUTO: 0.6 % — SIGNIFICANT CHANGE UP (ref 0–6)
GLUCOSE SERPL-MCNC: 103 MG/DL — HIGH (ref 70–99)
GLUCOSE UR QL: NEGATIVE — SIGNIFICANT CHANGE UP
HAPTOGLOB SERPL-MCNC: 99 MG/DL — SIGNIFICANT CHANGE UP (ref 34–200)
HCT VFR BLD CALC: 21.6 % — LOW (ref 34.5–45)
HCT VFR BLD CALC: 23 % — LOW (ref 34.5–45)
HGB BLD-MCNC: 7.5 G/DL — LOW (ref 11.5–15.5)
HGB BLD-MCNC: 8.1 G/DL — LOW (ref 11.5–15.5)
IMM GRANULOCYTES NFR BLD AUTO: 0.4 % — SIGNIFICANT CHANGE UP (ref 0–1.5)
KETONES UR-MCNC: NEGATIVE — SIGNIFICANT CHANGE UP
LDH SERPL L TO P-CCNC: 166 U/L — SIGNIFICANT CHANGE UP (ref 50–242)
LEUKOCYTE ESTERASE UR-ACNC: NEGATIVE — SIGNIFICANT CHANGE UP
LYMPHOCYTES # BLD AUTO: 2.61 K/UL — SIGNIFICANT CHANGE UP (ref 1–3.3)
LYMPHOCYTES # BLD AUTO: 22.9 % — SIGNIFICANT CHANGE UP (ref 13–44)
MCHC RBC-ENTMCNC: 30.9 PG — SIGNIFICANT CHANGE UP (ref 27–34)
MCHC RBC-ENTMCNC: 32.9 PG — SIGNIFICANT CHANGE UP (ref 27–34)
MCHC RBC-ENTMCNC: 34.7 GM/DL — SIGNIFICANT CHANGE UP (ref 32–36)
MCHC RBC-ENTMCNC: 35.2 GM/DL — SIGNIFICANT CHANGE UP (ref 32–36)
MCV RBC AUTO: 88.9 FL — SIGNIFICANT CHANGE UP (ref 80–100)
MCV RBC AUTO: 93.5 FL — SIGNIFICANT CHANGE UP (ref 80–100)
MONOCYTES # BLD AUTO: 0.95 K/UL — HIGH (ref 0–0.9)
MONOCYTES NFR BLD AUTO: 8.3 % — SIGNIFICANT CHANGE UP (ref 2–14)
NEUTROPHILS # BLD AUTO: 7.71 K/UL — HIGH (ref 1.8–7.4)
NEUTROPHILS NFR BLD AUTO: 67.6 % — SIGNIFICANT CHANGE UP (ref 43–77)
NITRITE UR-MCNC: NEGATIVE — SIGNIFICANT CHANGE UP
PH UR: 6 — SIGNIFICANT CHANGE UP (ref 5–8)
PLATELET # BLD AUTO: 107 K/UL — LOW (ref 150–400)
PLATELET # BLD AUTO: 108 K/UL — LOW (ref 150–400)
POTASSIUM SERPL-MCNC: 4 MMOL/L — SIGNIFICANT CHANGE UP (ref 3.5–5.3)
POTASSIUM SERPL-SCNC: 4 MMOL/L — SIGNIFICANT CHANGE UP (ref 3.5–5.3)
PROT SERPL-MCNC: 4.5 G/DL — LOW (ref 6–8.3)
PROT UR-MCNC: NEGATIVE — SIGNIFICANT CHANGE UP
RBC # BLD: 2.43 M/UL — LOW (ref 3.8–5.2)
RBC # BLD: 2.46 M/UL — LOW (ref 3.8–5.2)
RBC # FLD: 12.8 % — SIGNIFICANT CHANGE UP (ref 10.3–14.5)
RBC # FLD: 14.4 % — SIGNIFICANT CHANGE UP (ref 10.3–14.5)
RH IG SCN BLD-IMP: NEGATIVE — SIGNIFICANT CHANGE UP
SODIUM SERPL-SCNC: 139 MMOL/L — SIGNIFICANT CHANGE UP (ref 135–145)
SP GR SPEC: 1.01 — LOW (ref 1.01–1.02)
UROBILINOGEN FLD QL: NEGATIVE — SIGNIFICANT CHANGE UP
WBC # BLD: 11.2 K/UL — HIGH (ref 3.8–10.5)
WBC # BLD: 11.41 K/UL — HIGH (ref 3.8–10.5)
WBC # FLD AUTO: 11.2 K/UL — HIGH (ref 3.8–10.5)
WBC # FLD AUTO: 11.41 K/UL — HIGH (ref 3.8–10.5)

## 2018-06-14 PROCEDURE — 99233 SBSQ HOSP IP/OBS HIGH 50: CPT

## 2018-06-14 RX ORDER — OXYCODONE HYDROCHLORIDE 5 MG/1
5 TABLET ORAL EVERY 4 HOURS
Qty: 0 | Refills: 0 | Status: DISCONTINUED | OUTPATIENT
Start: 2018-06-14 | End: 2018-06-20

## 2018-06-14 RX ORDER — DIAZEPAM 5 MG
5 TABLET ORAL EVERY 8 HOURS
Qty: 0 | Refills: 0 | Status: DISCONTINUED | OUTPATIENT
Start: 2018-06-14 | End: 2018-06-19

## 2018-06-14 RX ORDER — ACETAMINOPHEN 500 MG
1000 TABLET ORAL ONCE
Qty: 0 | Refills: 0 | Status: COMPLETED | OUTPATIENT
Start: 2018-06-14 | End: 2018-06-14

## 2018-06-14 RX ORDER — SODIUM CHLORIDE 9 MG/ML
500 INJECTION INTRAMUSCULAR; INTRAVENOUS; SUBCUTANEOUS ONCE
Qty: 0 | Refills: 0 | Status: COMPLETED | OUTPATIENT
Start: 2018-06-14 | End: 2018-06-14

## 2018-06-14 RX ORDER — OXYCODONE HYDROCHLORIDE 5 MG/1
10 TABLET ORAL EVERY 4 HOURS
Qty: 0 | Refills: 0 | Status: DISCONTINUED | OUTPATIENT
Start: 2018-06-14 | End: 2018-06-20

## 2018-06-14 RX ADMIN — Medication 500 MILLIGRAM(S): at 17:10

## 2018-06-14 RX ADMIN — Medication 5 MILLIGRAM(S): at 08:51

## 2018-06-14 RX ADMIN — Medication 5 MILLIGRAM(S): at 15:53

## 2018-06-14 RX ADMIN — OXYCODONE HYDROCHLORIDE 10 MILLIGRAM(S): 5 TABLET ORAL at 22:00

## 2018-06-14 RX ADMIN — OXYCODONE HYDROCHLORIDE 10 MILLIGRAM(S): 5 TABLET ORAL at 18:09

## 2018-06-14 RX ADMIN — Medication 400 MILLIGRAM(S): at 20:26

## 2018-06-14 RX ADMIN — OXYCODONE HYDROCHLORIDE 10 MILLIGRAM(S): 5 TABLET ORAL at 21:11

## 2018-06-14 RX ADMIN — Medication 650 MILLIGRAM(S): at 14:25

## 2018-06-14 RX ADMIN — Medication 100 MILLIGRAM(S): at 13:15

## 2018-06-14 RX ADMIN — Medication 100 MILLIGRAM(S): at 21:11

## 2018-06-14 RX ADMIN — OXYCODONE HYDROCHLORIDE 10 MILLIGRAM(S): 5 TABLET ORAL at 17:09

## 2018-06-14 RX ADMIN — Medication 5 MILLIGRAM(S): at 02:03

## 2018-06-14 RX ADMIN — SODIUM CHLORIDE 2000 MILLILITER(S): 9 INJECTION INTRAMUSCULAR; INTRAVENOUS; SUBCUTANEOUS at 23:31

## 2018-06-14 RX ADMIN — Medication 500 MILLIGRAM(S): at 05:27

## 2018-06-14 RX ADMIN — DULOXETINE HYDROCHLORIDE 60 MILLIGRAM(S): 30 CAPSULE, DELAYED RELEASE ORAL at 13:15

## 2018-06-14 RX ADMIN — Medication 1000 MILLIGRAM(S): at 21:00

## 2018-06-14 RX ADMIN — Medication 1 TABLET(S): at 13:15

## 2018-06-14 RX ADMIN — OXYCODONE HYDROCHLORIDE 10 MILLIGRAM(S): 5 TABLET ORAL at 13:14

## 2018-06-14 RX ADMIN — OXYCODONE HYDROCHLORIDE 10 MILLIGRAM(S): 5 TABLET ORAL at 13:49

## 2018-06-14 RX ADMIN — Medication 400 MILLIGRAM(S): at 02:00

## 2018-06-14 RX ADMIN — SENNA PLUS 2 TABLET(S): 8.6 TABLET ORAL at 21:11

## 2018-06-14 RX ADMIN — Medication 100 MILLIGRAM(S): at 05:27

## 2018-06-14 NOTE — PROGRESS NOTE ADULT - PROBLEM SELECTOR PLAN 1
Differential dx in this setting is quite broad including respiratory infection, post-procedural fever, transfusion related fever, VTE. Will add on LDH/hapto/bili to AM labs to eval for delayed hemolytic transfusion reaction. Monitor off abx, incentive spirometry. UA is neg, CXR is neg. Would check dopplers.

## 2018-06-14 NOTE — PROGRESS NOTE ADULT - SUBJECTIVE AND OBJECTIVE BOX
Authored by Dr Michael Pope [phone]     Patient is a 58y old  Female who presents with a chief complaint of "I need surgery for my lower back". (2018 07:16)    SUBJECTIVE / OVERNIGHT EVENTS: fever overnight    MEDICATIONS  (STANDING):  ascorbic acid 500 milliGRAM(s) Oral two times a day  diazepam    Tablet 5 milliGRAM(s) Oral every 6 hours  docusate sodium 100 milliGRAM(s) Oral three times a day  DULoxetine 60 milliGRAM(s) Oral daily  multivitamin 1 Tablet(s) Oral daily  senna 2 Tablet(s) Oral at bedtime  sodium chloride 0.9%. 1000 milliLiter(s) (20 mL/Hr) IV Continuous <Continuous>    MEDICATIONS  (PRN):  acetaminophen   Tablet 650 milliGRAM(s) Oral every 6 hours PRN For Temp greater than 38 C (100.4 F)  acetaminophen   Tablet. 650 milliGRAM(s) Oral every 6 hours PRN Mild Pain (1 - 3)  diphenhydrAMINE   Capsule 25 milliGRAM(s) Oral every 4 hours PRN Pruritus  magnesium hydroxide Suspension 30 milliLiter(s) Oral every 12 hours PRN Constipation  naloxone Injectable 0.1 milliGRAM(s) IV Push every 3 minutes PRN For ANY of the following changes in patient status:  A. RR LESS THAN 10 breaths per minute, B. Oxygen saturation LESS THAN 90%, C. Sedation score of 6  ondansetron Injectable 4 milliGRAM(s) IV Push every 6 hours PRN Nausea  oxyCODONE    IR 5 milliGRAM(s) Oral every 4 hours PRN Moderate Pain (4 - 6)  oxyCODONE    IR 10 milliGRAM(s) Oral every 4 hours PRN Severe Pain (7 - 10)      Vital Signs Last 24 Hrs  T(C): 37.1 (2018 08:44), Max: 39.2 (2018 21:45)  T(F): 98.7 (2018 08:44), Max: 102.5 (2018 21:45)  HR: 86 (2018 08:44) (72 - 105)  BP: 110/76 (2018 08:44) (95/57 - 114/72)  BP(mean): --  RR: 18 (2018 08:44) (16 - 19)  SpO2: 98% (2018 08:44) (91% - 98%)  CAPILLARY BLOOD GLUCOSE        I&O's Summary    2018 07:01  -  2018 07:00  --------------------------------------------------------  IN: 1350 mL / OUT: 3721 mL / NET: -2371 mL    HMV output 500-600 cc in 24 hrs    PHYSICAL EXAM  GENERAL: NAD, well-developed  EYES: conjunctiva and sclera clear  NECK: Supple, No JVD  ENT: MMM  CHEST/LUNG: Clear to auscultation bilaterally; No wheeze  HEART: Regular rate and rhythm; No murmurs  ABDOMEN: Soft, Nontender, Nondistended; Bowel sounds present  EXTREMITIES:  2+ Peripheral Pulses, No clubbing, cyanosis, or edema  NEURO: nonfocal, AOx3  PSYCH: calm   SKIN: No rashes or lesions    LABS:                        7.5    11.41 )-----------( 107      ( 2018 08:18 )             21.6     06-14    139  |  103  |  11  ----------------------------<  103<H>  4.0   |  27  |  0.79    Ca    7.4<L>      2018 06:50  Phos  3.5     06-13  Mg     2.4     06-13      PT/INR - ( 2018 01:13 )   PT: 12.7 sec;   INR: 1.16 ratio         PTT - ( 2018 01:13 )  PTT:24.2 sec      Urinalysis Basic - ( 2018 00:40 )    Color: PL Yellow / Appearance: Clear / S.007 / pH: x  Gluc: x / Ketone: Negative  / Bili: Negative / Urobili: Negative   Blood: x / Protein: Negative / Nitrite: Negative   Leuk Esterase: Negative / RBC: x / WBC x   Sq Epi: x / Non Sq Epi: x / Bacteria: x    Protein/Creatinine Ratio, Urine (18 @ 15:47)    Creatinine, Random Urine: 98: mg/dL    Total Protein, Random Urine: 7 mg/dL    Protein/Creatinine Ratio Calculation: 0.1 Ratio      RADIOLOGY & ADDITIONAL TESTS:    Imaging Personally Reviewed: < from: Xray Chest 1 View- PORTABLE-Urgent (18 @ 22:15) >  Poor inspiratory effort. The heart is normal in size. The lungs are   clear. The visualized bones are normal.          < end of copied text >    Consultant(s) Notes Reviewed:    Care Discussed with Consultants/Other Providers: BRIE Dukes re: anemia Authored by Dr Michael Pope [phone]     Patient is a 58y old  Female who presents with a chief complaint of "I need surgery for my lower back". (2018 07:16)    SUBJECTIVE / OVERNIGHT EVENTS: fever overnight. some sore throat, no cough, no dysuria, no n/v/d, no rash, no pain at IV site. No CP or SOB. Edema is resolving slowly.     MEDICATIONS  (STANDING):  ascorbic acid 500 milliGRAM(s) Oral two times a day  diazepam    Tablet 5 milliGRAM(s) Oral every 6 hours  docusate sodium 100 milliGRAM(s) Oral three times a day  DULoxetine 60 milliGRAM(s) Oral daily  multivitamin 1 Tablet(s) Oral daily  senna 2 Tablet(s) Oral at bedtime  sodium chloride 0.9%. 1000 milliLiter(s) (20 mL/Hr) IV Continuous <Continuous>    MEDICATIONS  (PRN):  acetaminophen   Tablet 650 milliGRAM(s) Oral every 6 hours PRN For Temp greater than 38 C (100.4 F)  acetaminophen   Tablet. 650 milliGRAM(s) Oral every 6 hours PRN Mild Pain (1 - 3)  diphenhydrAMINE   Capsule 25 milliGRAM(s) Oral every 4 hours PRN Pruritus  magnesium hydroxide Suspension 30 milliLiter(s) Oral every 12 hours PRN Constipation  naloxone Injectable 0.1 milliGRAM(s) IV Push every 3 minutes PRN For ANY of the following changes in patient status:  A. RR LESS THAN 10 breaths per minute, B. Oxygen saturation LESS THAN 90%, C. Sedation score of 6  ondansetron Injectable 4 milliGRAM(s) IV Push every 6 hours PRN Nausea  oxyCODONE    IR 5 milliGRAM(s) Oral every 4 hours PRN Moderate Pain (4 - 6)  oxyCODONE    IR 10 milliGRAM(s) Oral every 4 hours PRN Severe Pain (7 - 10)      Vital Signs Last 24 Hrs  T(C): 37.1 (2018 08:44), Max: 39.2 (2018 21:45)  T(F): 98.7 (2018 08:44), Max: 102.5 (2018 21:45)  HR: 86 (2018 08:44) (72 - 105)  BP: 110/76 (2018 08:44) (95/57 - 114/72)  BP(mean): --  RR: 18 (2018 08:44) (16 - 19)  SpO2: 98% (2018 08:44) (91% - 98%)  CAPILLARY BLOOD GLUCOSE        I&O's Summary    2018 07:01  -  2018 07:00  --------------------------------------------------------  IN: 1350 mL / OUT: 3721 mL / NET: -2371 mL    HMV output 500-600 cc in 24 hrs    PHYSICAL EXAM  GENERAL: NAD, anxious   HEAD:  Atraumatic, Normocephalic  EYES: EOMI, PERRLA, conjunctiva and sclera clear, facial edema is resolving  ENMT: Moist mucous membranes improved facial flushing  NECK: Supple, No JVD  RESPIRATORY: Clear to auscultation bilaterally; No rales, rhonchi, wheezing, or rubs  CARDIOVASCULAR: Regular rate and rhythm; No murmurs, rubs, or gallops  GASTROINTESTINAL: Soft, Nontender, Nondistended; Bowel sounds present  GENITOURINARY: richter removed  EXTREMITIES:  2+ Peripheral Pulses, No clubbing, cyanosis, or edema  NEURO:  Alert & Oriented X3; Moving all 4 extremities; No gross sensory deficits  HEME/LYMPH: No lymphadenopathy noted  SKIN: Dependent pitting and nonpitting edema b/l LE 1+ most prominent in thighs w/some interval improvement. facial flushing + HMV x 2 originating out of lumbar surgical incision. surgical incision is clean, covered.     LABS:                        7.5    11.41 )-----------( 107      ( 2018 08:18 )             21.6     06-14    139  |  103  |  11  ----------------------------<  103<H>  4.0   |  27  |  0.79    Ca    7.4<L>      2018 06:50  Phos  3.5     06-13  Mg     2.4     06-13      PT/INR - ( 2018 01:13 )   PT: 12.7 sec;   INR: 1.16 ratio         PTT - ( 2018 01:13 )  PTT:24.2 sec      Urinalysis Basic - ( 2018 00:40 )    Color: PL Yellow / Appearance: Clear / S.007 / pH: x  Gluc: x / Ketone: Negative  / Bili: Negative / Urobili: Negative   Blood: x / Protein: Negative / Nitrite: Negative   Leuk Esterase: Negative / RBC: x / WBC x   Sq Epi: x / Non Sq Epi: x / Bacteria: x    Protein/Creatinine Ratio, Urine (18 @ 15:47)    Creatinine, Random Urine: 98: mg/dL    Total Protein, Random Urine: 7 mg/dL    Protein/Creatinine Ratio Calculation: 0.1 Ratio      RADIOLOGY & ADDITIONAL TESTS:    Imaging Personally Reviewed: < from: Xray Chest 1 View- PORTABLE-Urgent (18 @ 22:15) >  Poor inspiratory effort. The heart is normal in size. The lungs are   clear. The visualized bones are normal.  < end of copied text >    Consultant(s) Notes Reviewed:    Care Discussed with Consultants/Other Providers: BRIE Dukes re: anemia

## 2018-06-14 NOTE — PROGRESS NOTE ADULT - ASSESSMENT
57F w Anxiety, chronic low back pain s/p  L3-L4 lumbar laminectomy and fusion  in 2011, thyroid nodule, ?aortic valve insufficiency a/w progressive low back pain that radiates down the right leg now s/p removal of lumbar hardware  L2-5 decompression  L2- 3  L4 -5 posterior lumbar fusion  L2 - 5 Fusion c/b blood loss anemia w/hypotension, fever overnight

## 2018-06-14 NOTE — PROGRESS NOTE ADULT - ASSESSMENT
HPI: 57 year old female with history of  Anxiety, chronic low back pain s/p  L3-L4 lumbar laminectomy and fusion  in 2011. Pt has been c/o progressive  low back pain that radiates down the right leg with associated numbness and tingling for 3 moths followed by neurology tried pain medication without any relief s/p MRI revealed Junctional stenosis ,Grade 1 spondylolisthesis ,right synovial cyst . Presents to [facility] for scheduled Removal of Lumbar hardware  L2-5 decompression  L2- 3  L4 -5 posterior Lumbar Fusion  L2 - 5 Fusion on 6/12/2018. (05 Jun 2018 11:03)    PROCEDURE: 6/12 s/p L4-S1 PLIF, extension of fusion to L2, resection of synovial cyst; POD# 2     PLAN:  -Continue HMV drains; monitor output  -Discontinued PCA today; transition to Oxy IR PRN  -Mild hypotension may be secondary to PCA vs hypovolemia  -Post operative anemia; will check repeat CBC and transfuse if continues to trend down  -Valium PRN for muscle spasm  -Fever work up ; awaiting blood cultures. UA and CXR without signs of infection. Trend WBC. Will obtain lower extremity dopplers  -Encouraged incentive spirometer and instructed patient on proper use  -Encouraged mobilization with assistance  -Colace, senna for bowel regimen  -DVT prophylaxis: venodynes; off SQL given high HMV outputs and decreasing H&H  -Will discuss with Dr. Valentino Marie # [phone]

## 2018-06-14 NOTE — PROGRESS NOTE ADULT - SUBJECTIVE AND OBJECTIVE BOX
Day 2 of Anesthesia Pain Management Service    SUBJECTIVE: Patient is doing well with IV PCA    Pain Scale Score:	[X] Refer to charted pain scores    THERAPY:    [ ] IV PCA Morphine		[ ] 5 mg/mL	[ ] 1 mg/mL  [X] IV PCA Hydromorphone	[ ] 5 mg/mL	[X] 1 mg/mL  [ ] IV PCA Fentanyl		[ ] 50 micrograms/mL    Demand dose: 0.2 mg     Lockout: 6 minutes   Continuous Rate: 0 mg/hr  4 Hour Limit: 4 mg    MEDICATIONS  (STANDING):  ascorbic acid 500 milliGRAM(s) Oral two times a day  diazepam    Tablet 5 milliGRAM(s) Oral every 6 hours  docusate sodium 100 milliGRAM(s) Oral three times a day  DULoxetine 60 milliGRAM(s) Oral daily  HYDROmorphone PCA (1 mG/mL) 30 milliLiter(s) PCA Continuous PCA Continuous  multivitamin 1 Tablet(s) Oral daily  senna 2 Tablet(s) Oral at bedtime  sodium chloride 0.9%. 1000 milliLiter(s) (20 mL/Hr) IV Continuous <Continuous>    MEDICATIONS  (PRN):  acetaminophen   Tablet 650 milliGRAM(s) Oral every 6 hours PRN For Temp greater than 38 C (100.4 F)  acetaminophen   Tablet. 650 milliGRAM(s) Oral every 6 hours PRN Mild Pain (1 - 3)  diphenhydrAMINE   Capsule 25 milliGRAM(s) Oral every 4 hours PRN Pruritus  HYDROmorphone PCA (1 mG/mL) Rescue Clinician Bolus 0.5 milliGRAM(s) IV Push every 15 minutes PRN for Pain Scale GREATER THAN 6  magnesium hydroxide Suspension 30 milliLiter(s) Oral every 12 hours PRN Constipation  naloxone Injectable 0.1 milliGRAM(s) IV Push every 3 minutes PRN For ANY of the following changes in patient status:  A. RR LESS THAN 10 breaths per minute, B. Oxygen saturation LESS THAN 90%, C. Sedation score of 6  ondansetron Injectable 4 milliGRAM(s) IV Push every 6 hours PRN Nausea      OBJECTIVE:    Sedation Score:	[ X] Alert	[ ] Drowsy 	[ ] Arousable	[ ] Asleep	[ ] Unresponsive    Side Effects:	[X ] None	[ ] Nausea	[ ] Vomiting	[ ] Pruritus  		[ ] Other:    Vital Signs Last 24 Hrs  T(C): 37.1 (14 Jun 2018 08:44), Max: 39.2 (13 Jun 2018 21:45)  T(F): 98.7 (14 Jun 2018 08:44), Max: 102.5 (13 Jun 2018 21:45)  HR: 86 (14 Jun 2018 08:44) (72 - 105)  BP: 110/76 (14 Jun 2018 08:44) (90/56 - 114/72)  BP(mean): --  RR: 18 (14 Jun 2018 08:44) (16 - 19)  SpO2: 98% (14 Jun 2018 08:44) (91% - 98%)    ASSESSMENT/ PLAN    Therapy to  be:               [] Continued   [ x] Discontinued   [x ] Changed to PRN Analgesics    Documentation and Verification of current medications:   [X] Done	[ ] Not done, not eligible    Comments:

## 2018-06-14 NOTE — PROGRESS NOTE ADULT - SUBJECTIVE AND OBJECTIVE BOX
SUBJECTIVE: Patient seen and examined at bedside. Some incisional pain, but improved with pain medications. Febrile overnight. Denies chest pain, shortness of breath, nausea/vomiting. Complains of persistent facial swelling.     Vital Signs Last 24 Hrs  T(C): 37.1 (06-14-18 @ 08:44), Max: 39.2 (06-13-18 @ 21:45)  T(F): 98.7 (06-14-18 @ 08:44), Max: 102.5 (06-13-18 @ 21:45)  HR: 86 (06-14-18 @ 08:44) (86 - 105)  BP: 110/76 (06-14-18 @ 08:44) (95/57 - 114/72)  RR: 18 (06-14-18 @ 08:44) (16 - 18)  SpO2: 98% (06-14-18 @ 08:44) (91% - 98%)    PHYSICAL EXAM:    Constitutional: No Acute Distress, resting comfortably in bed    Neurological: Awake, alert, oriented to person, place and time, speech clear and fluent, face equal, tongue midline, briskly following commands, no drift, moves all extremities with 5/5 strength, sensation intact to light touch throughout, pupils 4mm and reactive bilaterally, extraocular movements intact, no nystagmus    Incision: +dressing C/D/I    Drains: +LT HMV w/ 160cc output/24hrs; +RT HMV w/ 460cc output / 24 hrs    Pulmonary: Clear to Auscultation, No rales, No rhonchi, No wheezes     Cardiovascular: S1, S2, Regular rate and rhythm     Gastrointestinal: Soft, Non-tender, Non-distended, +bowel sounds x 4    Extremities: No calf tenderness bilaterally, no cyanosis, clubbing or edema        LABS:                          7.5    11.41 )-----------( 107      ( 14 Jun 2018 08:18 )             21.6    06-14    139  |  103  |  11  ----------------------------<  103<H>  4.0   |  27  |  0.79    Ca    7.4<L>      14 Jun 2018 06:50  Phos  3.5     06-13  Mg     2.4     06-13    TPro  4.5<L>  /  Alb  2.5<L>  /  TBili  0.5  /  DBili  0.1  /  AST  31  /  ALT  21  /  AlkPhos  43  06-14  PT/INR - ( 13 Jun 2018 01:13 )   PT: 12.7 sec;   INR: 1.16 ratio         PTT - ( 13 Jun 2018 01:13 )  PTT:24.2 sec    06-13 @ 07:01  -  06-14 @ 07:00  --------------------------------------------------------  IN: 1350 mL / OUT: 3721 mL / NET: -2371 mL        IMAGING:     < from: Xray Chest 1 View- PORTABLE-Urgent (06.13.18 @ 22:15) >  Impression:    Poor inspiratory effort. The heart is normal in size. The lungs are   clear. The visualized bones are normal.    < end of copied text >        MEDICATIONS:  Antibiotics:    Neuro:  acetaminophen   Tablet 650 milliGRAM(s) Oral every 6 hours PRN For Temp greater than 38 C (100.4 F)  acetaminophen   Tablet. 650 milliGRAM(s) Oral every 6 hours PRN Mild Pain (1 - 3)  diazepam    Tablet 5 milliGRAM(s) Oral every 8 hours PRN muscle spasm  diphenhydrAMINE   Capsule 25 milliGRAM(s) Oral every 4 hours PRN Pruritus  DULoxetine 60 milliGRAM(s) Oral daily  ondansetron Injectable 4 milliGRAM(s) IV Push every 6 hours PRN Nausea  oxyCODONE    IR 5 milliGRAM(s) Oral every 4 hours PRN Moderate Pain (4 - 6)  oxyCODONE    IR 10 milliGRAM(s) Oral every 4 hours PRN Severe Pain (7 - 10)    Cardiac:    Pulm:    GI/:  docusate sodium 100 milliGRAM(s) Oral three times a day  magnesium hydroxide Suspension 30 milliLiter(s) Oral every 12 hours PRN Constipation  senna 2 Tablet(s) Oral at bedtime    Other:   ascorbic acid 500 milliGRAM(s) Oral two times a day  multivitamin 1 Tablet(s) Oral daily  naloxone Injectable 0.1 milliGRAM(s) IV Push every 3 minutes PRN For ANY of the following changes in patient status:  A. RR LESS THAN 10 breaths per minute, B. Oxygen saturation LESS THAN 90%, C. Sedation score of 6  sodium chloride 0.9%. 1000 milliLiter(s) IV Continuous <Continuous>        DIET: regular

## 2018-06-14 NOTE — PROGRESS NOTE ADULT - PROBLEM SELECTOR PLAN 4
Improving from yesterday's rosalba - con't to monitor zeina if more PRBC are forthcoming, likely dilutional

## 2018-06-15 DIAGNOSIS — R09.81 NASAL CONGESTION: ICD-10-CM

## 2018-06-15 LAB
ANION GAP SERPL CALC-SCNC: 9 MMOL/L — SIGNIFICANT CHANGE UP (ref 5–17)
APTT 50/50 2HOUR INCUB: SIGNIFICANT CHANGE UP (ref 27.5–37.4)
APTT BLD: 27.7 SEC — SIGNIFICANT CHANGE UP (ref 27.5–37.4)
APTT BLD: 28.7 SEC — SIGNIFICANT CHANGE UP (ref 27.5–37.4)
APTT BLD: SIGNIFICANT CHANGE UP (ref 27.5–37.4)
BASOPHILS # BLD AUTO: 0.01 K/UL — SIGNIFICANT CHANGE UP (ref 0–0.2)
BASOPHILS NFR BLD AUTO: 0.1 % — SIGNIFICANT CHANGE UP (ref 0–2)
BUN SERPL-MCNC: 7 MG/DL — SIGNIFICANT CHANGE UP (ref 7–23)
CALCIUM SERPL-MCNC: 7.6 MG/DL — LOW (ref 8.4–10.5)
CHLORIDE SERPL-SCNC: 106 MMOL/L — SIGNIFICANT CHANGE UP (ref 96–108)
CO2 SERPL-SCNC: 25 MMOL/L — SIGNIFICANT CHANGE UP (ref 22–31)
CREAT SERPL-MCNC: 0.67 MG/DL — SIGNIFICANT CHANGE UP (ref 0.5–1.3)
D DIMER BLD IA.RAPID-MCNC: 969 NG/ML DDU — HIGH
EOSINOPHIL # BLD AUTO: 0.1 K/UL — SIGNIFICANT CHANGE UP (ref 0–0.5)
EOSINOPHIL NFR BLD AUTO: 1.4 % — SIGNIFICANT CHANGE UP (ref 0–6)
FACT VIII ACT/NOR PPP: 296 % — HIGH (ref 60–125)
FIBRINOGEN PPP-MCNC: 1052 MG/DL — HIGH (ref 310–510)
GLUCOSE SERPL-MCNC: 109 MG/DL — HIGH (ref 70–99)
HCT VFR BLD CALC: 20 % — CRITICAL LOW (ref 34.5–45)
HGB BLD-MCNC: 7 G/DL — CRITICAL LOW (ref 11.5–15.5)
IMM GRANULOCYTES NFR BLD AUTO: 0.6 % — SIGNIFICANT CHANGE UP (ref 0–1.5)
INR BLD: 1.2 RATIO — HIGH (ref 0.88–1.16)
INR BLD: 1.22 RATIO — HIGH (ref 0.88–1.16)
IRON SATN MFR SERPL: 16 % — SIGNIFICANT CHANGE UP (ref 14–50)
IRON SATN MFR SERPL: 21 UG/DL — LOW (ref 30–160)
LYMPHOCYTES # BLD AUTO: 1.48 K/UL — SIGNIFICANT CHANGE UP (ref 1–3.3)
LYMPHOCYTES # BLD AUTO: 21 % — SIGNIFICANT CHANGE UP (ref 13–44)
MCHC RBC-ENTMCNC: 31.4 PG — SIGNIFICANT CHANGE UP (ref 27–34)
MCHC RBC-ENTMCNC: 35 GM/DL — SIGNIFICANT CHANGE UP (ref 32–36)
MCV RBC AUTO: 89.7 FL — SIGNIFICANT CHANGE UP (ref 80–100)
MONOCYTES # BLD AUTO: 0.54 K/UL — SIGNIFICANT CHANGE UP (ref 0–0.9)
MONOCYTES NFR BLD AUTO: 7.6 % — SIGNIFICANT CHANGE UP (ref 2–14)
NEUTROPHILS # BLD AUTO: 4.89 K/UL — SIGNIFICANT CHANGE UP (ref 1.8–7.4)
NEUTROPHILS NFR BLD AUTO: 69.3 % — SIGNIFICANT CHANGE UP (ref 43–77)
PAT CTL 2H: SIGNIFICANT CHANGE UP SEC (ref 27.5–37.4)
PLATELET # BLD AUTO: 111 K/UL — LOW (ref 150–400)
POTASSIUM SERPL-MCNC: 3.8 MMOL/L — SIGNIFICANT CHANGE UP (ref 3.5–5.3)
POTASSIUM SERPL-SCNC: 3.8 MMOL/L — SIGNIFICANT CHANGE UP (ref 3.5–5.3)
PROTHROM AB SERPL-ACNC: 13.1 SEC — HIGH (ref 9.8–12.7)
PROTHROM AB SERPL-ACNC: 13.8 SEC — HIGH (ref 10–13.1)
PT 100%: 13.1 SEC — HIGH (ref 9.8–12.7)
PT 50/50: 11.9 SEC — SIGNIFICANT CHANGE UP (ref 9.7–15.2)
RBC # BLD: 2.23 M/UL — LOW (ref 3.8–5.2)
RBC # FLD: 14.1 % — SIGNIFICANT CHANGE UP (ref 10.3–14.5)
SODIUM SERPL-SCNC: 140 MMOL/L — SIGNIFICANT CHANGE UP (ref 135–145)
SURGICAL PATHOLOGY STUDY: SIGNIFICANT CHANGE UP
THROMBIN TIME: 20.6 SECS — SIGNIFICANT CHANGE UP (ref 16–25)
TIBC SERPL-MCNC: 132 UG/DL — LOW (ref 220–430)
UIBC SERPL-MCNC: 111 UG/DL — SIGNIFICANT CHANGE UP (ref 110–370)
VWF AG ACT/NOR PPP IA: 269 % — HIGH (ref 63–170)
VWF:RCO ACT/NOR PPP PL AGG: 308 % — HIGH (ref 45–133)
WBC # BLD: 7.06 K/UL — SIGNIFICANT CHANGE UP (ref 3.8–10.5)
WBC # FLD AUTO: 7.06 K/UL — SIGNIFICANT CHANGE UP (ref 3.8–10.5)

## 2018-06-15 PROCEDURE — 31231 NASAL ENDOSCOPY DX: CPT

## 2018-06-15 PROCEDURE — 99233 SBSQ HOSP IP/OBS HIGH 50: CPT

## 2018-06-15 PROCEDURE — 99254 IP/OBS CNSLTJ NEW/EST MOD 60: CPT | Mod: 25

## 2018-06-15 PROCEDURE — 99254 IP/OBS CNSLTJ NEW/EST MOD 60: CPT | Mod: GC

## 2018-06-15 PROCEDURE — 93970 EXTREMITY STUDY: CPT | Mod: 26

## 2018-06-15 RX ORDER — ACETAMINOPHEN 500 MG
1000 TABLET ORAL ONCE
Qty: 0 | Refills: 0 | Status: COMPLETED | OUTPATIENT
Start: 2018-06-15 | End: 2018-06-15

## 2018-06-15 RX ADMIN — OXYCODONE HYDROCHLORIDE 10 MILLIGRAM(S): 5 TABLET ORAL at 07:55

## 2018-06-15 RX ADMIN — Medication 500 MILLIGRAM(S): at 17:15

## 2018-06-15 RX ADMIN — OXYCODONE HYDROCHLORIDE 5 MILLIGRAM(S): 5 TABLET ORAL at 19:43

## 2018-06-15 RX ADMIN — SENNA PLUS 2 TABLET(S): 8.6 TABLET ORAL at 21:20

## 2018-06-15 RX ADMIN — OXYCODONE HYDROCHLORIDE 10 MILLIGRAM(S): 5 TABLET ORAL at 22:45

## 2018-06-15 RX ADMIN — Medication 5 MILLIGRAM(S): at 16:15

## 2018-06-15 RX ADMIN — Medication 1 TABLET(S): at 11:24

## 2018-06-15 RX ADMIN — OXYCODONE HYDROCHLORIDE 10 MILLIGRAM(S): 5 TABLET ORAL at 21:37

## 2018-06-15 RX ADMIN — OXYCODONE HYDROCHLORIDE 10 MILLIGRAM(S): 5 TABLET ORAL at 02:34

## 2018-06-15 RX ADMIN — Medication 100 MILLIGRAM(S): at 13:25

## 2018-06-15 RX ADMIN — Medication 650 MILLIGRAM(S): at 02:26

## 2018-06-15 RX ADMIN — Medication 100 MILLIGRAM(S): at 05:08

## 2018-06-15 RX ADMIN — Medication 500 MILLIGRAM(S): at 05:08

## 2018-06-15 RX ADMIN — Medication 1000 MILLIGRAM(S): at 12:45

## 2018-06-15 RX ADMIN — OXYCODONE HYDROCHLORIDE 10 MILLIGRAM(S): 5 TABLET ORAL at 17:16

## 2018-06-15 RX ADMIN — OXYCODONE HYDROCHLORIDE 5 MILLIGRAM(S): 5 TABLET ORAL at 20:45

## 2018-06-15 RX ADMIN — Medication 5 MILLIGRAM(S): at 05:08

## 2018-06-15 RX ADMIN — DULOXETINE HYDROCHLORIDE 60 MILLIGRAM(S): 30 CAPSULE, DELAYED RELEASE ORAL at 11:24

## 2018-06-15 RX ADMIN — OXYCODONE HYDROCHLORIDE 10 MILLIGRAM(S): 5 TABLET ORAL at 07:23

## 2018-06-15 RX ADMIN — Medication 100 MILLIGRAM(S): at 21:20

## 2018-06-15 RX ADMIN — Medication 650 MILLIGRAM(S): at 03:30

## 2018-06-15 RX ADMIN — Medication 400 MILLIGRAM(S): at 12:31

## 2018-06-15 RX ADMIN — OXYCODONE HYDROCHLORIDE 10 MILLIGRAM(S): 5 TABLET ORAL at 03:30

## 2018-06-15 NOTE — CONSULT NOTE ADULT - SUBJECTIVE AND OBJECTIVE BOX
HPI: 58 yo F hx chronic LBP s/p L3-L4 lumbar laminectomy and fusion () now with progressive LBP associated with paresthesias s/p Lumbar fusion L2-S1,  L4-S1 PLIF and large L4-L5 synovial cyst removal on 18 post op course c/b anemia. Hematology consulted to r/o bleeding disorder given her degree of anemia and blood loss.     PAST MEDICAL & SURGICAL HISTORY:  Spondylosis without myelopathy or radiculopathy, lumbar region  Chronic Low Back Pain: since 10/2010  Aortic Regurgitation: mild - as per last Echo -2016 -no change  Anxiety  Lumbar Disc Disorder  Thyroid Nodule: benign- last thyroid BX  follow endocrinologist  U/S every year  MVP (Mitral Valve Prolapse)a  H/O meniscectomy of right knee:   History of laminectomy:   Lumbar Synovial Cyst  Excision of Scalp Cyst:   S/P Laparoscopic Procedure: for Infertility  Left Breast Cyst: - benign  S/P Dilatation and Curettage:  for miscarriage  S/P  Section: x3    Allergies    No Known Allergies    Intolerances    MEDICATIONS  (STANDING):  ascorbic acid 500 milliGRAM(s) Oral two times a day  docusate sodium 100 milliGRAM(s) Oral three times a day  DULoxetine 60 milliGRAM(s) Oral daily  multivitamin 1 Tablet(s) Oral daily  senna 2 Tablet(s) Oral at bedtime    MEDICATIONS  (PRN):  acetaminophen   Tablet 650 milliGRAM(s) Oral every 6 hours PRN For Temp greater than 38 C (100.4 F)  acetaminophen   Tablet. 650 milliGRAM(s) Oral every 6 hours PRN Mild Pain (1 - 3)  diazepam    Tablet 5 milliGRAM(s) Oral every 8 hours PRN muscle spasm  diphenhydrAMINE   Capsule 25 milliGRAM(s) Oral every 4 hours PRN Pruritus  magnesium hydroxide Suspension 30 milliLiter(s) Oral every 12 hours PRN Constipation  naloxone Injectable 0.1 milliGRAM(s) IV Push every 3 minutes PRN For ANY of the following changes in patient status:  A. RR LESS THAN 10 breaths per minute, B. Oxygen saturation LESS THAN 90%, C. Sedation score of 6  ondansetron Injectable 4 milliGRAM(s) IV Push every 6 hours PRN Nausea  oxyCODONE    IR 5 milliGRAM(s) Oral every 4 hours PRN Moderate Pain (4 - 6)  oxyCODONE    IR 10 milliGRAM(s) Oral every 4 hours PRN Severe Pain (7 - 10)      FAMILY HISTORY:    SOCIAL HISTORY: former 6pk year smoker, no ETOH use    REVIEW OF SYSTEMS: see HPI  All other review of systems is negative unless indicated above    VITALS:   T(F): 98.7 (06-15-18 @ 13:14), Max: 102.5 (18 @ 14:30)  HR: 84 (06-15-18 @ 13:14)  BP: 126/66 (06-15-18 @ 13:14)  RR: 18 (06-15-18 @ 13:14)  SpO2: 97% (06-15-18 @ 13:14)  Wt(kg): --    PHYSICAL EXAM INCOMPLETE    GENERAL: NAD, well-developed  HEAD:  Atraumatic, Normocephalic  EYES: EOMI, PERRLA, conjunctiva and sclera clear  NECK: Supple, No JVD  CHEST/LUNG: Clear to auscultation bilaterally; No wheeze  HEART: Regular rate and rhythm; No murmurs, rubs, or gallops  ABDOMEN: Soft, Nontender, Nondistended; Bowel sounds present  EXTREMITIES:  2+ Peripheral Pulses, No clubbing, cyanosis, or edema  NEUROLOGY: non-focal  SKIN: No rashes or lesions    LABS:                         7.0    7.06  )-----------( 111      ( 15 Godry 2018 07:49 )             20.0       -15    140  |  106  |  7   ----------------------------<  109<H>  3.8   |  25  |  0.67    Ca    7.6<L>      15 Gordy 2018 06:39    TPro  4.5<L>  /  Alb  2.5<L>  /  TBili  0.5  /  DBili  0.1  /  AST  31  /  ALT  21  /  AlkPhos  43  06-14    PT/INR - ( 15 Gordy 2018 08:00 )   PT: 13.8 sec;   INR: 1.22 ratio    PTT - ( 15 Gordy 2018 08:00 )  PTT:27.7 sec    IMAGING: HPI: 56 yo F hx chronic LBP s/p L3-L4 lumbar laminectomy and fusion () now with progressive LBP associated with paresthesias s/p Lumbar fusion L2-S1,  L4-S1 PLIF and large L4-L5 synovial cyst removal on 18 post op course c/b anemia. Hematology consulted to r/o bleeding disorder given her degree of anemia and blood loss.     She has had 3 c-sections in the past and laminectomy in , denies any bleeding complications post-op after those procedures. No history of mucosal bleeding or heavy menses. No easy bruising. Denies any family history of blood disorders. Her drains were removed today but had been having serosanguinous output previously. Denies any CP or SOB, no abdominal pain. Has been had fevers on  and , CXR and blood cultures negative and currently no on any antibiotics. Her Hgb on pre-op labs was 13.9 and normal coags, today Hgb of 7.0.     PAST MEDICAL & SURGICAL HISTORY:  Spondylosis without myelopathy or radiculopathy, lumbar region  Chronic Low Back Pain: since 10/2010  Aortic Regurgitation: mild - as per last Echo - -no change  Anxiety  Lumbar Disc Disorder  Thyroid Nodule: benign- last thyroid BX  follow endocrinologist  U/S every year  MVP (Mitral Valve Prolapse)a  H/O meniscectomy of right knee:   History of laminectomy:   Lumbar Synovial Cyst  Excision of Scalp Cyst:   S/P Laparoscopic Procedure: for Infertility  Left Breast Cyst: - benign  S/P Dilatation and Curettage:  for miscarriage  S/P  Section: x3    Allergies    No Known Allergies    Intolerances    MEDICATIONS  (STANDING):  ascorbic acid 500 milliGRAM(s) Oral two times a day  docusate sodium 100 milliGRAM(s) Oral three times a day  DULoxetine 60 milliGRAM(s) Oral daily  multivitamin 1 Tablet(s) Oral daily  senna 2 Tablet(s) Oral at bedtime    MEDICATIONS  (PRN):  acetaminophen   Tablet 650 milliGRAM(s) Oral every 6 hours PRN For Temp greater than 38 C (100.4 F)  acetaminophen   Tablet. 650 milliGRAM(s) Oral every 6 hours PRN Mild Pain (1 - 3)  diazepam    Tablet 5 milliGRAM(s) Oral every 8 hours PRN muscle spasm  diphenhydrAMINE   Capsule 25 milliGRAM(s) Oral every 4 hours PRN Pruritus  magnesium hydroxide Suspension 30 milliLiter(s) Oral every 12 hours PRN Constipation  naloxone Injectable 0.1 milliGRAM(s) IV Push every 3 minutes PRN For ANY of the following changes in patient status:  A. RR LESS THAN 10 breaths per minute, B. Oxygen saturation LESS THAN 90%, C. Sedation score of 6  ondansetron Injectable 4 milliGRAM(s) IV Push every 6 hours PRN Nausea  oxyCODONE    IR 5 milliGRAM(s) Oral every 4 hours PRN Moderate Pain (4 - 6)  oxyCODONE    IR 10 milliGRAM(s) Oral every 4 hours PRN Severe Pain (7 - 10)      FAMILY HISTORY: no pertinent history    SOCIAL HISTORY: former 6pk year smoker, no ETOH use    REVIEW OF SYSTEMS: see HPI  All other review of systems is negative unless indicated above    VITALS:   T(F): 98.7 (06-15-18 @ 13:14), Max: 102.5 (18 @ 14:30)  HR: 84 (06-15-18 @ 13:14)  BP: 126/66 (06-15-18 @ 13:14)  RR: 18 (06-15-18 @ 13:14)  SpO2: 97% (06-15-18 @ 13:14)  Wt(kg): --    PHYSICAL EXAM    GENERAL: NAD, well-developed  EYES: EOMI, PERRLA, conjunctiva and sclera clear  NECK: Supple  CHEST/LUNG: Clear to auscultation bilaterally; No wheeze  HEART: Regular rate and rhythm; No murmurs, rubs, or gallops  ABDOMEN: Soft, Nontender, Nondistended; Bowel sounds present  EXTREMITIES: no LE edema  NEUROLOGY: non-focal  SKIN: No rashes or lesions    LABS:                         7.0    7.06  )-----------( 111      ( 15 Gordy 2018 07:49 )             20.0     -15    140  |  106  |  7   ----------------------------<  109<H>  3.8   |  25  |  0.67    Ca    7.6<L>      15 Gordy 2018 06:39    TPro  4.5<L>  /  Alb  2.5<L>  /  TBili  0.5  /  DBili  0.1  /  AST  31  /  ALT  21  /  AlkPhos  43  06-14    PT/INR - ( 15 Gordy 2018 08:00 )   PT: 13.8 sec;   INR: 1.22 ratio    PTT - ( 15 Gordy 2018 08:00 )  PTT:27.7 sec    IMAGING:

## 2018-06-15 NOTE — DIETITIAN INITIAL EVALUATION ADULT. - OTHER INFO
seen for assess consult, admitted for "I need surgery for my lower back"., consuming 35% of meals-she has been trying to consume fluids, fruit, protein, salads, cheerio's, oatmeal.  has a bad taste in her mouth, refused suppplements, denies nausea/vomit, denies difficulty chewing /swallow. last BM yesterday. NKFA.

## 2018-06-15 NOTE — PROGRESS NOTE ADULT - PROBLEM SELECTOR PLAN 5
resolving but monitor especially in setting of PRBC transfusion which can chelate ionized ca - would trend ionized Ca w/post transfusion CBC

## 2018-06-15 NOTE — CONSULT NOTE ADULT - ASSESSMENT
58y/o female with chronic low back pain s/p  L3-L4 lumbar laminectomy and fusion  in 2011 with ongoing back pain now POD # 3 s/p Removal of lumbar hardware  L2-5 decompression  L2- 3  L4 -5 posterior lumbar fusion  L2 - 5 Fusion. Patient with persistent fevers postop now with c/o nasal congestion, frontal sinus pain. 56 y/o female with chronic low back pain s/p  L3-L4 lumbar laminectomy and fusion  in 2011 with ongoing back pain now POD # 3 s/p Removal of lumbar hardware  L2-5 decompression  L2- 3  L4 -5 posterior lumbar fusion  L2 - 5 Fusion. Patient with persistent fevers postop now with c/o nasal congestion, frontal sinus pain. 56 y/o female with chronic low back pain s/p  L3-L4 lumbar laminectomy and fusion  in 2011 with ongoing back pain now POD # 3 s/p Removal of lumbar hardware  L2-5 decompression  L2- 3  L4 -5 posterior lumbar fusion  L2 - 5 Fusion. Patient with persistent fevers postop now with c/o nasal congestion, frontal sinus pain.   Likely viral sinusitis, would hold off on abx at this time, if persist or gets worse would consider Abx

## 2018-06-15 NOTE — PROGRESS NOTE ADULT - ASSESSMENT
HPI:  Patient is a 57 year old female who presented with low back pain.  She had prior lumbar spine surgery in 2011.  The low back pain has been persistent and pain radiates to the right lower extremity with intermittent numbness and tingling.  MRI done which showed stenosis, spondylolisthesis, and right synovial cyst.  Now presents for surgery.    PROCEDURE: s/p b/l decompressive laminectomies, medial facetectomy, and foraminotomy at L2-L3, L4-S1 decompressive laminectomies and medial facetectomy and foraminotomy, removal of synovial cyst at L4-L5, L4-L5 posterior lumbar interbody fusion, and extension of fusion to L2 on 6/12/2018  POD#3    PLAN:  -hemovacs taken off suction and removed with no difficulties, patient tolerated well with no immediate complications  -transfuse 1U pRBC, patient explained risks, benefits, and alternatives, she understands and agrees  -ENT saw, recs appreciated, indirect laryngoscopy today  -oxycodone for pain control  -valium for muscle spasms  -home medications  -SCDs for DVT prophylaxis  -hematology recs appreciated, will follow  -regular diet  -out of bed with assistance  -incentive spirometer for lung expansion  -pt - home pt w/ rolling walker upon discharge  -am labs    Spectra #[id number] HPI:  Patient is a 57 year old female who presented with low back pain.  She had prior lumbar spine surgery in 2011.  The low back pain has been persistent and pain radiates to the right lower extremity with intermittent numbness and tingling.  MRI done which showed stenosis, spondylolisthesis, and right synovial cyst.  Now presents for surgery.    PROCEDURE: s/p b/l decompressive laminectomies, medial facetectomy, and foraminotomy at L2-L3, L4-S1 decompressive laminectomies and medial facetectomy and foraminotomy, removal of synovial cyst at L4-L5, L4-L5 posterior lumbar interbody fusion, and extension of fusion to L2 on 6/12/2018  POD#3    PLAN:  -hemovacs taken off suction and removed with no difficulties, patient tolerated well with no immediate complications  -transfuse 1U pRBC, patient explained risks, benefits, and alternatives, she understands and agrees  -ENT saw, recs appreciated, indirect laryngoscopy today  -oxycodone for pain control  -valium for muscle spasms  -home medications  -hold lovenox/heparin for concern for HIT, SCDs for DVT prophylaxis  -HIT panel and WANDA pending  -hematology recs appreciated, will follow  -regular diet  -out of bed with assistance  -incentive spirometer for lung expansion  -pt - home pt w/ rolling walker upon discharge  -am labs    Spectra #[id number]

## 2018-06-15 NOTE — PROGRESS NOTE ADULT - SUBJECTIVE AND OBJECTIVE BOX
Authored by Dr Michael Pope [phone]     Patient is a 58y old  Female who presents with a chief complaint of "I need surgery for my lower back". (2018 07:16)    SUBJECTIVE / OVERNIGHT EVENTS: febrile yesterday afternoon, hypotensive overnight s/p 500 cc bolus w/improvement. Pt c/o b/l frontal HA, mild nasal congestion. No CP or SOB, no rhinorrhea, no cough, minimal sore throat. Using bathroom appropriately. Self diuresing. Tolerable back pain post procedurally    MEDICATIONS  (STANDING):  ascorbic acid 500 milliGRAM(s) Oral two times a day  docusate sodium 100 milliGRAM(s) Oral three times a day  DULoxetine 60 milliGRAM(s) Oral daily  multivitamin 1 Tablet(s) Oral daily  senna 2 Tablet(s) Oral at bedtime    MEDICATIONS  (PRN):  acetaminophen   Tablet 650 milliGRAM(s) Oral every 6 hours PRN For Temp greater than 38 C (100.4 F)  acetaminophen   Tablet. 650 milliGRAM(s) Oral every 6 hours PRN Mild Pain (1 - 3)  diazepam    Tablet 5 milliGRAM(s) Oral every 8 hours PRN muscle spasm  diphenhydrAMINE   Capsule 25 milliGRAM(s) Oral every 4 hours PRN Pruritus  magnesium hydroxide Suspension 30 milliLiter(s) Oral every 12 hours PRN Constipation  naloxone Injectable 0.1 milliGRAM(s) IV Push every 3 minutes PRN For ANY of the following changes in patient status:  A. RR LESS THAN 10 breaths per minute, B. Oxygen saturation LESS THAN 90%, C. Sedation score of 6  ondansetron Injectable 4 milliGRAM(s) IV Push every 6 hours PRN Nausea  oxyCODONE    IR 5 milliGRAM(s) Oral every 4 hours PRN Moderate Pain (4 - 6)  oxyCODONE    IR 10 milliGRAM(s) Oral every 4 hours PRN Severe Pain (7 - 10)      Vital Signs Last 24 Hrs  T(C): 37.1 (15 Gordy 2018 04:40), Max: 39.2 (2018 14:30)  T(F): 98.7 (15 Gordy 2018 04:40), Max: 102.5 (2018 14:30)  HR: 83 (15 Gordy 2018 04:40) (81 - 98)  BP: 111/67 (15 Gordy 2018 04:40) (81/44 - 122/73)  BP(mean): --  RR: 18 (15 Gordy 2018 04:40) (18 - 18)  SpO2: 93% (15 Gordy 2018 04:40) (93% - 98%)  CAPILLARY BLOOD GLUCOSE        I&O's Summary    2018 07:01  -  15 Gordy 2018 07:00  --------------------------------------------------------  IN: 980 mL / OUT: 245 mL / NET: 735 mL        PHYSICAL EXAM  GENERAL: NAD, calm  HEAD:  Atraumatic, Normocephalic  EYES: EOMI, PERRLA, conjunctiva and sclera clear, facial edema is resolved  ENMT: Moist mucous membranes improved facial flushing. L sided peritonsillar exudate w/mildly deviated uvula to R. Mild tenderness to palpation of bifrontal and maxillary areas  NECK: Supple, No JVD  RESPIRATORY: Clear to auscultation bilaterally; No rales, rhonchi, wheezing, or rubs  CARDIOVASCULAR: Regular rate and rhythm; No murmurs, rubs, or gallops  GASTROINTESTINAL: Soft, Nontender, Nondistended; Bowel sounds present  EXTREMITIES:  2+ Peripheral Pulses, No clubbing, cyanosis, or edema  NEURO:  Alert & Oriented X3; Moving all 4 extremities; No gross sensory deficits  SKIN: Dependent pitting and nonpitting edema b/l LE 1+ most prominent in thighs w/some interval improvement. facial flushing + HMV x 2 originating out of lumbar surgical incision. surgical incision is clean, covered. Peripheral IV no sign of phlebitis    LABS:                        7.0    7.06  )-----------( 111      ( 15 Gordy 2018 07:49 )             20.0     06-15    140  |  106  |  7   ----------------------------<  109<H>  3.8   |  25  |  0.67    Ca    7.6<L>      15 Gordy 2018 06:39    TPro  4.5<L>  /  Alb  2.5<L>  /  TBili  0.5  /  DBili  0.1  /  AST  31  /  ALT  21  /  AlkPhos  43  06-14    PT/INR - ( 15 Gordy 2018 08:00 )   PT: 13.8 sec;   INR: 1.22 ratio         PTT - ( 15 Gordy 2018 08:00 )  PTT:27.7 sec  Haptoglobin, Serum: 99 mg/dL (18 @ 15:24)    Lactate Dehydrogenase, Serum: 166 U/L (18 @ 06:50)        Urinalysis Basic - ( 2018 00:40 )    Color: PL Yellow / Appearance: Clear / S.007 / pH: x  Gluc: x / Ketone: Negative  / Bili: Negative / Urobili: Negative   Blood: x / Protein: Negative / Nitrite: Negative   Leuk Esterase: Negative / RBC: x / WBC x   Sq Epi: x / Non Sq Epi: x / Bacteria: x        Culture - Blood (collected 2018 01:59)  Source: .Blood Blood-Venous  Preliminary Report (15 Gordy 2018 02:01):    No growth to date.    Culture - Blood (collected 2018 01:59)  Source: .Blood Blood-Venous  Preliminary Report (15 Gordy 2018 02:01):    No growth to date.        RADIOLOGY & ADDITIONAL TESTS:    Imaging Personally Reviewed: doppler pending  Consultant(s) Notes Reviewed:    Care Discussed with Consultants/Other Providers: BRIE Ellis re: anemia, fever w/u

## 2018-06-15 NOTE — CONSULT NOTE ADULT - SUBJECTIVE AND OBJECTIVE BOX
CC: "Headaches, nasal congestion, sinus pain".    HPI: Patient is a 58y old Female who presents with lower back pain s/p Lumbar fusion L2-S1 fusion, L4-S1 PLIF on 18. We are asked to evaluate the patient for complaints of headaches, nasal congestion and frontal sinus pain. Patient states this all started about 2 days ago.  Patient with intermittent fevers, Blood/Urine/Resp cxs all negative to date. Patient denies pain in the throat or rhinorrhea. Denies dysphagia, able to tolerate diet. Denies hemoptysis/unintentional weight loss.    PAST MEDICAL & SURGICAL HISTORY:  Spondylosis without myelopathy or radiculopathy, lumbar region  Chronic Low Back Pain: since 10/2010  Aortic Regurgitation: mild - as per last Echo - -no change  Anxiety  Lumbar Disc Disorder  Thyroid Nodule: benign- last thyroid BX  follow endocrinologist  U/S every year  MVP (Mitral Valve Prolapse)  H/O meniscectomy of right knee:   History of laminectomy:   Lumbar Synovial Cyst  Excision of Scalp Cyst:   S/P Laparoscopic Procedure: for Infertility  Left Breast Cyst: - benign  S/P Dilatation and Curettage:  for miscarriage  S/P  Section: x3    Allergies  No Known Allergies    Intolerances      MEDICATIONS  (STANDING):  ascorbic acid 500 milliGRAM(s) Oral two times a day  docusate sodium 100 milliGRAM(s) Oral three times a day  DULoxetine 60 milliGRAM(s) Oral daily  multivitamin 1 Tablet(s) Oral daily  senna 2 Tablet(s) Oral at bedtime    MEDICATIONS  (PRN):  acetaminophen   Tablet 650 milliGRAM(s) Oral every 6 hours PRN For Temp greater than 38 C (100.4 F)  acetaminophen   Tablet. 650 milliGRAM(s) Oral every 6 hours PRN Mild Pain (1 - 3)  diazepam    Tablet 5 milliGRAM(s) Oral every 8 hours PRN muscle spasm  diphenhydrAMINE   Capsule 25 milliGRAM(s) Oral every 4 hours PRN Pruritus  magnesium hydroxide Suspension 30 milliLiter(s) Oral every 12 hours PRN Constipation  naloxone Injectable 0.1 milliGRAM(s) IV Push every 3 minutes PRN For ANY of the following changes in patient status:  A. RR LESS THAN 10 breaths per minute, B. Oxygen saturation LESS THAN 90%, C. Sedation score of 6  ondansetron Injectable 4 milliGRAM(s) IV Push every 6 hours PRN Nausea  oxyCODONE    IR 5 milliGRAM(s) Oral every 4 hours PRN Moderate Pain (4 - 6)  oxyCODONE    IR 10 milliGRAM(s) Oral every 4 hours PRN Severe Pain (7 - 10)    Social History: ????????    ROS: ENT - nasal congestion, rhinorrhea, frontal sinus pain         GI, , CV, Pulm, Neuro, Psych, MS, Heme, Endo, Constitutional; all negative except as noted in HPI    Vital Signs Last 24 Hrs  T(C): 37.1 (15 Gordy 2018 04:40), Max: 39.2 (2018 14:30)  T(F): 98.7 (15 Gordy 2018 04:40), Max: 102.5 (2018 14:30)  HR: 83 (15 Gordy 2018 04:40) (81 - 98)  BP: 111/67 (15 Gordy 2018 04:40) (81/44 - 122/73)  BP(mean): --  RR: 18 (15 Gordy 2018 04:40) (18 - 18)  SpO2: 93% (15 Gordy 2018 04:40) (93% - 98%)                          7.0    7.06  )-----------( 111      ( 15 Gordy 2018 07:49 )             20.0    06-15    140  |  106  |  7   ----------------------------<  109<H>  3.8   |  25  |  0.67    Ca    7.6<L>      15 Gordy 2018 06:39    TPro  4.5<L>  /  Alb  2.5<L>  /  TBili  0.5  /  DBili  0.1  /  AST  31  /  ALT  21  /  AlkPhos  43  06-14   PT/INR - ( 15 Gordy 2018 08:00 )   PT: 13.8 sec;   INR: 1.22 ratio         PTT - ( 15 Gordy 2018 08:00 )  PTT:27.7 sec    PHYSICAL EXAM:  Gen: NAD, well-developed, no significant distress  Head: Normocephalic, Atraumatic  Face: + Facial flushing, mild tenderness to palpation of bifrontal and maxillary areas  Eyes: PERRL, EOMI, no scleral injection  Ears: Right - ear canal clear, TM intact without effusion            Left - ear canal clear, TM intact without effusion  Nose: Nares bilaterally patent, no discharge  Mouth: Left sided peritonsillar exudate w/mildly deviated uvula to Right.   Neck: Flat, supple, no lymphadenopathy, trachea midline, no masses  Resp: breathing easily, no stridor  CV: no peripheral edema/cyanosis CC: "Headaches, nasal congestion, sinus pain".    HPI: Patient is a 58y old Female who presents with lower back pain s/p Lumbar fusion L2-S1 fusion, L4-S1 PLIF on 18. We are asked to evaluate the patient for complaints of headaches, nasal congestion and frontal sinus pain. Patient states this all started yesterday. Complains of pain around periorbital area that feels like pressure that radiates to her head.  Patient with intermittent fevers, this morning afebrile. Last fever recorded to 102/5 yesterday afternoon. Blood cxs negative to date. Patient denies pain in the throat or rhinorrhea. Denies dysphagia, able to tolerate diet. Denies hemoptysis/unintentional weight loss. Patient also denies vertigo, dizziness, tinnitus, or decreased hearing. No history of sinusitis in the past.    PAST MEDICAL & SURGICAL HISTORY:  Spondylosis without myelopathy or radiculopathy, lumbar region  Chronic Low Back Pain: since 10/2010  Aortic Regurgitation: mild - as per last Echo - -no change  Anxiety  Lumbar Disc Disorder  Thyroid Nodule: benign- last thyroid BX  follow endocrinologist  U/S every year  MVP (Mitral Valve Prolapse)  H/O meniscectomy of right knee: 2016  History of laminectomy:   Lumbar Synovial Cyst  Excision of Scalp Cyst:   S/P Laparoscopic Procedure: for Infertility  Left Breast Cyst: - benign  S/P Dilatation and Curettage:  for miscarriage  S/P  Section: x3    Allergies  No Known Allergies    Intolerances      MEDICATIONS  (STANDING):  ascorbic acid 500 milliGRAM(s) Oral two times a day  docusate sodium 100 milliGRAM(s) Oral three times a day  DULoxetine 60 milliGRAM(s) Oral daily  multivitamin 1 Tablet(s) Oral daily  senna 2 Tablet(s) Oral at bedtime    MEDICATIONS  (PRN):  acetaminophen   Tablet 650 milliGRAM(s) Oral every 6 hours PRN For Temp greater than 38 C (100.4 F)  acetaminophen   Tablet. 650 milliGRAM(s) Oral every 6 hours PRN Mild Pain (1 - 3)  diazepam    Tablet 5 milliGRAM(s) Oral every 8 hours PRN muscle spasm  diphenhydrAMINE   Capsule 25 milliGRAM(s) Oral every 4 hours PRN Pruritus  magnesium hydroxide Suspension 30 milliLiter(s) Oral every 12 hours PRN Constipation  naloxone Injectable 0.1 milliGRAM(s) IV Push every 3 minutes PRN For ANY of the following changes in patient status:  A. RR LESS THAN 10 breaths per minute, B. Oxygen saturation LESS THAN 90%, C. Sedation score of 6  ondansetron Injectable 4 milliGRAM(s) IV Push every 6 hours PRN Nausea  oxyCODONE    IR 5 milliGRAM(s) Oral every 4 hours PRN Moderate Pain (4 - 6)  oxyCODONE    IR 10 milliGRAM(s) Oral every 4 hours PRN Severe Pain (7 - 10)    Social History: No smoking    ROS: ENT - nasal congestion, rhinorrhea, frontal sinus pain         GI, , CV, Pulm, Neuro, Psych, MS, Heme, Endo, Constitutional; all negative except as noted in HPI    Vital Signs Last 24 Hrs  T(C): 37.1 (15 Gordy 2018 04:40), Max: 39.2 (2018 14:30)  T(F): 98.7 (15 Gordy 2018 04:40), Max: 102.5 (2018 14:30)  HR: 83 (15 Gordy 2018 04:40) (81 - 98)  BP: 111/67 (15 Gordy 2018 04:40) (81/44 - 122/73)  BP(mean): --  RR: 18 (15 Gordy 2018 04:40) (18 - 18)  SpO2: 93% (15 Gordy 2018 04:40) (93% - 98%)                          7.0    7.06  )-----------( 111      ( 15 Gordy 2018 07:49 )             20.0    06-15    140  |  106  |  7   ----------------------------<  109<H>  3.8   |  25  |  0.67    Ca    7.6<L>      15 Gordy 2018 06:39    TPro  4.5<L>  /  Alb  2.5<L>  /  TBili  0.5  /  DBili  0.1  /  AST  31  /  ALT  21  /  AlkPhos  43  06-14   PT/INR - ( 15 Gordy 2018 08:00 )   PT: 13.8 sec;   INR: 1.22 ratio         PTT - ( 15 Gordy 2018 08:00 )  PTT:27.7 sec    PHYSICAL EXAM:  Gen: NAD, well-developed, no significant distress  Head: Normocephalic, Atraumatic  Face: mild tenderness to palpation of bifrontal and maxillary areas  Eyes: PERRL, EOMI, no scleral injection  Ears: Right - ear canal clear, TM intact without effusion            Left - ear canal clear, TM intact without effusion  Nose: Nares bilaterally patent, no discharge, some excoriation to septum but no active bleeding  Mouth: Uvula midline, no exudates. No pain on palpation of peritonsilar area bilaterally, no fluctuance.  Neck: Flat, supple, no lymphadenopathy, trachea midline, no masses  Resp: breathing easily, no stridor  CV: no peripheral edema/cyanosis CC: "Headaches, nasal congestion, sinus pain".    HPI: Patient is a 58y old Female who presents with lower back pain s/p Lumbar fusion L2-S1 fusion, L4-S1 PLIF on 18. We are asked to evaluate the patient for complaints of headaches, nasal congestion and frontal sinus pain. Patient states this all started yesterday. Complains of pain around periorbital area that feels like pressure that radiates to her head.  Patient with intermittent fevers, this morning afebrile. Last fever recorded to 102/5 yesterday afternoon. Blood cxs negative to date. Patient denies pain in the throat or rhinorrhea. Denies dysphagia, able to tolerate diet. Denies hemoptysis/unintentional weight loss. Patient also denies vertigo, dizziness, tinnitus, or decreased hearing. No history of sinusitis in the past.    PAST MEDICAL & SURGICAL HISTORY:  Spondylosis without myelopathy or radiculopathy, lumbar region  Chronic Low Back Pain: since 10/2010  Aortic Regurgitation: mild - as per last Echo - -no change  Anxiety  Lumbar Disc Disorder  Thyroid Nodule: benign- last thyroid BX  follow endocrinologist  U/S every year  MVP (Mitral Valve Prolapse)  H/O meniscectomy of right knee: 2016  History of laminectomy:   Lumbar Synovial Cyst  Excision of Scalp Cyst:   S/P Laparoscopic Procedure: for Infertility  Left Breast Cyst: - benign  S/P Dilatation and Curettage:  for miscarriage  S/P  Section: x3    Allergies  No Known Allergies    Intolerances      MEDICATIONS  (STANDING):  ascorbic acid 500 milliGRAM(s) Oral two times a day  docusate sodium 100 milliGRAM(s) Oral three times a day  DULoxetine 60 milliGRAM(s) Oral daily  multivitamin 1 Tablet(s) Oral daily  senna 2 Tablet(s) Oral at bedtime    MEDICATIONS  (PRN):  acetaminophen   Tablet 650 milliGRAM(s) Oral every 6 hours PRN For Temp greater than 38 C (100.4 F)  acetaminophen   Tablet. 650 milliGRAM(s) Oral every 6 hours PRN Mild Pain (1 - 3)  diazepam    Tablet 5 milliGRAM(s) Oral every 8 hours PRN muscle spasm  diphenhydrAMINE   Capsule 25 milliGRAM(s) Oral every 4 hours PRN Pruritus  magnesium hydroxide Suspension 30 milliLiter(s) Oral every 12 hours PRN Constipation  naloxone Injectable 0.1 milliGRAM(s) IV Push every 3 minutes PRN For ANY of the following changes in patient status:  A. RR LESS THAN 10 breaths per minute, B. Oxygen saturation LESS THAN 90%, C. Sedation score of 6  ondansetron Injectable 4 milliGRAM(s) IV Push every 6 hours PRN Nausea  oxyCODONE    IR 5 milliGRAM(s) Oral every 4 hours PRN Moderate Pain (4 - 6)  oxyCODONE    IR 10 milliGRAM(s) Oral every 4 hours PRN Severe Pain (7 - 10)    Social History: No smoking    ROS: ENT - nasal congestion, rhinorrhea, frontal sinus pain         GI, , CV, Pulm, Neuro, Psych, MS, Heme, Endo, Constitutional; all negative except as noted in HPI    Vital Signs Last 24 Hrs  T(C): 37.1 (15 Gordy 2018 04:40), Max: 39.2 (2018 14:30)  T(F): 98.7 (15 Gordy 2018 04:40), Max: 102.5 (2018 14:30)  HR: 83 (15 Gordy 2018 04:40) (81 - 98)  BP: 111/67 (15 Gordy 2018 04:40) (81/44 - 122/73)  BP(mean): --  RR: 18 (15 Gordy 2018 04:40) (18 - 18)  SpO2: 93% (15 Gordy 2018 04:40) (93% - 98%)                          7.0    7.06  )-----------( 111      ( 15 Gordy 2018 07:49 )             20.0    06-15    140  |  106  |  7   ----------------------------<  109<H>  3.8   |  25  |  0.67    Ca    7.6<L>      15 Gordy 2018 06:39    TPro  4.5<L>  /  Alb  2.5<L>  /  TBili  0.5  /  DBili  0.1  /  AST  31  /  ALT  21  /  AlkPhos  43  06-14   PT/INR - ( 15 Gordy 2018 08:00 )   PT: 13.8 sec;   INR: 1.22 ratio         PTT - ( 15 Gordy 2018 08:00 )  PTT:27.7 sec    PHYSICAL EXAM:  Gen: NAD, well-developed, no significant distress  Head: Normocephalic, Atraumatic  Face: mild tenderness to palpation of bifrontal and maxillary areas  Eyes: PERRL, EOMI, no scleral injection  Ears: Right - ear canal clear, TM intact without effusion            Left - ear canal clear, TM intact without effusion  Nose: Nares bilaterally patent, no discharge, some excoriation to septum but no active bleeding  Nasal endoscopy: No purulent drainage, no discharge, Right septal deviation  Mouth: Uvula midline, no exudates. No pain on palpation of peritonsillar area bilaterally, no fluctuance.  Neck: Flat, supple, no lymphadenopathy, trachea midline, no masses  Resp: breathing easily, no stridor  CV: no peripheral edema/cyanosis CC: "Headaches, nasal congestion, sinus pain".    HPI: Patient is a 58y old Female who presents with lower back pain s/p Lumbar fusion L2-S1 fusion, L4-S1 PLIF on 18. We are asked to evaluate the patient for complaints of headaches, nasal congestion and frontal sinus pain. Patient states this all started yesterday. Complains of pain around periorbital area that feels like pressure that radiates to her head.  Patient with intermittent fevers, this morning afebrile. Last fever recorded to 102/5 yesterday afternoon. Blood cxs negative to date. Patient denies pain in the throat or rhinorrhea. Denies dysphagia, able to tolerate diet. Denies hemoptysis/unintentional weight loss. Patient also denies vertigo, dizziness, tinnitus, or decreased hearing. No history of sinusitis in the past.    PAST MEDICAL & SURGICAL HISTORY:  Spondylosis without myelopathy or radiculopathy, lumbar region  Chronic Low Back Pain: since 10/2010  Aortic Regurgitation: mild - as per last Echo - -no change  Anxiety  Lumbar Disc Disorder  Thyroid Nodule: benign- last thyroid BX  follow endocrinologist  U/S every year  MVP (Mitral Valve Prolapse)  H/O meniscectomy of right knee: 2016  History of laminectomy:   Lumbar Synovial Cyst  Excision of Scalp Cyst:   S/P Laparoscopic Procedure: for Infertility  Left Breast Cyst: - benign  S/P Dilatation and Curettage:  for miscarriage  S/P  Section: x3    Allergies  No Known Allergies    Intolerances      MEDICATIONS  (STANDING):  ascorbic acid 500 milliGRAM(s) Oral two times a day  docusate sodium 100 milliGRAM(s) Oral three times a day  DULoxetine 60 milliGRAM(s) Oral daily  multivitamin 1 Tablet(s) Oral daily  senna 2 Tablet(s) Oral at bedtime    MEDICATIONS  (PRN):  acetaminophen   Tablet 650 milliGRAM(s) Oral every 6 hours PRN For Temp greater than 38 C (100.4 F)  acetaminophen   Tablet. 650 milliGRAM(s) Oral every 6 hours PRN Mild Pain (1 - 3)  diazepam    Tablet 5 milliGRAM(s) Oral every 8 hours PRN muscle spasm  diphenhydrAMINE   Capsule 25 milliGRAM(s) Oral every 4 hours PRN Pruritus  magnesium hydroxide Suspension 30 milliLiter(s) Oral every 12 hours PRN Constipation  naloxone Injectable 0.1 milliGRAM(s) IV Push every 3 minutes PRN For ANY of the following changes in patient status:  A. RR LESS THAN 10 breaths per minute, B. Oxygen saturation LESS THAN 90%, C. Sedation score of 6  ondansetron Injectable 4 milliGRAM(s) IV Push every 6 hours PRN Nausea  oxyCODONE    IR 5 milliGRAM(s) Oral every 4 hours PRN Moderate Pain (4 - 6)  oxyCODONE    IR 10 milliGRAM(s) Oral every 4 hours PRN Severe Pain (7 - 10)    Social History: No smoking    ROS: ENT - nasal congestion, rhinorrhea, frontal sinus pain         GI, , CV, Pulm, Neuro, Psych, MS, Heme, Endo, Constitutional; all negative except as noted in HPI    Vital Signs Last 24 Hrs  T(C): 37.1 (15 Gordy 2018 04:40), Max: 39.2 (2018 14:30)  T(F): 98.7 (15 Gordy 2018 04:40), Max: 102.5 (2018 14:30)  HR: 83 (15 Gordy 2018 04:40) (81 - 98)  BP: 111/67 (15 Gordy 2018 04:40) (81/44 - 122/73)  BP(mean): --  RR: 18 (15 Gordy 2018 04:40) (18 - 18)  SpO2: 93% (15 Gordy 2018 04:40) (93% - 98%)                          7.0    7.06  )-----------( 111      ( 15 Gordy 2018 07:49 )             20.0    06-15    140  |  106  |  7   ----------------------------<  109<H>  3.8   |  25  |  0.67    Ca    7.6<L>      15 Gordy 2018 06:39    TPro  4.5<L>  /  Alb  2.5<L>  /  TBili  0.5  /  DBili  0.1  /  AST  31  /  ALT  21  /  AlkPhos  43  06-14   PT/INR - ( 15 Gordy 2018 08:00 )   PT: 13.8 sec;   INR: 1.22 ratio         PTT - ( 15 Gordy 2018 08:00 )  PTT:27.7 sec    PHYSICAL EXAM:  Gen: NAD, well-developed, no significant distress  Head: Normocephalic, Atraumatic  Face: mild tenderness to palpation of bifrontal and maxillary areas  Eyes: PERRL, EOMI, no scleral injection  Ears: Right - ear canal clear, TM intact without effusion            Left - ear canal clear, TM intact without effusion  Nose: Nares bilaterally patent, no discharge, some excoriation to septum but no active bleeding  Mouth: Uvula midline, no exudates. No pain on palpation of peritonsillar area bilaterally, no fluctuance.  Neck: Flat, supple, no lymphadenopathy, trachea midline, no masses  Resp: breathing easily, no stridor  CV: no peripheral edema/cyanosis    Nasal endoscopy scope #3- b/l nasal cavities expolored with scope - back to NP, b/l middle meatus and spheoethmoidal recesses turbinates with mild hypertrophy, No purulent drainage, no discharge, Right septal deviation

## 2018-06-15 NOTE — PROGRESS NOTE ADULT - SUBJECTIVE AND OBJECTIVE BOX
subjective: less pain    PHYSICAL EXAM:    Constitutional:    Neurological:     Motor exam:          Upper extremity                         Delt     Bicep     Tricep    HG                                                 R         5/5        5/5        5/5       5/5                                               L          5/5        5/5        5/5       5/5          Lower extremity                        HF         KF        KE       DF         PF                                                  R        5/5 5/5        5/5       5/5         5/5                                               L         5/5 5/5       5/5       5/5          5/5                                                        [x] warm well perfused; capillary refill <3 seconds     Sensation: [x] intact to light touch  [] decrease      Incision: Dressing clean and dry    Misc:Hemovacs  with serous fluid, H/H 7/20, INR now 1.22

## 2018-06-15 NOTE — PROGRESS NOTE ADULT - PROBLEM SELECTOR PLAN 1
Differential dx in this setting is quite broad including upper v lower respiratory infection, post-procedural fever, transfusion related fever, VTE.   -no evidence of hemolysis  -no leukocytosis, no evidence of UTI or PNA by symptoms/imaging/labs  -concern for pharyngitis +/- sinus infection, peritonsillar exudate w/symptoms - recommend ENT eval  -Monitor off abx, incentive spirometry.   -f/u dopplers.  -ID consult at next fever

## 2018-06-15 NOTE — PROGRESS NOTE ADULT - SUBJECTIVE AND OBJECTIVE BOX
HPI:  Patient is a 57 year old female who presented with low back pain.  She had prior lumbar spine surgery in .  The low back pain has been persistent and pain radiates to the right lower extremity with intermittent numbness and tingling.  MRI done which showed stenosis, spondylolisthesis, and right synovial cyst.  Now presents for surgery.    OVERNIGHT EVENTS:  Patient's hemoglobin has been slowly downtrending, this morning at 7.0.  Decision made to transfuse pRBC.  Has been complaining of sinus pressure over the last 2 days.  Denies any chest pain, shortness of breath, or any other new symptoms.  Has been out of bed.    Vital Signs Last 24 Hrs  T(C): 37.1 (15 Gordy 2018 13:14), Max: 39.2 (2018 14:30)  T(F): 98.7 (15 Gordy 2018 13:14), Max: 102.5 (2018 14:30)  HR: 84 (15 Gordy 2018 13:14) (81 - 98)  BP: 126/66 (15 Gordy 2018 13:14) (81/44 - 126/66)  BP(mean): --  RR: 18 (15 Gordy 2018 13:14) (18 - 18)  SpO2: 97% (15 Gordy 2018 13:14) (93% - 98%)      PHYSICAL EXAM:  Neurological: awake, alert, oriented x3, follows commands, speech clear and fluent, moves all extremities x4 w/ 5/5 strength throughout, sensation present, intact, equal throughout    Cardiovascular: +s1, s2  Respiratory: clear to auscultation b/l  Gastrointestinal: soft, non-distended, non-tender  Genitourinary: +voiding  Incision/Wound: posterior midline vertical incision c/d/i w/ staples, hemovacs x2    TUBES/LINES:  [x] hemovac x2 (155cc, 90cc serous>sanguinous per 24 hours)    DIET:  [x] regular    LABS:                        7.0    7.06  )-----------( 111      ( 15 Gordy 2018 07:49 )             20.0     06-15    140  |  106  |  7   ----------------------------<  109<H>  3.8   |  25  |  0.67    Ca    7.6<L>      15 Gordy 2018 06:39    TPro  4.5<L>  /  Alb  2.5<L>  /  TBili  0.5  /  DBili  0.1  /  AST  31  /  ALT  21  /  AlkPhos  43  06-    PT/INR - ( 15 Gordy 2018 08:00 )   PT: 13.8 sec;   INR: 1.22 ratio         PTT - ( 15 Gordy 2018 08:00 )  PTT:27.7 sec  Urinalysis Basic - ( 2018 00:40 )    Color: PL Yellow / Appearance: Clear / S.007 / pH: x  Gluc: x / Ketone: Negative  / Bili: Negative / Urobili: Negative   Blood: x / Protein: Negative / Nitrite: Negative   Leuk Esterase: Negative / RBC: x / WBC x   Sq Epi: x / Non Sq Epi: x / Bacteria: x        Allergies    No Known Allergies          MEDICATIONS:  Antibiotics: none    Neuro:  acetaminophen   Tablet 650 milliGRAM(s) Oral every 6 hours PRN  acetaminophen   Tablet. 650 milliGRAM(s) Oral every 6 hours PRN  diazepam    Tablet 5 milliGRAM(s) Oral every 8 hours PRN  diphenhydrAMINE   Capsule 25 milliGRAM(s) Oral every 4 hours PRN  DULoxetine 60 milliGRAM(s) Oral daily  ondansetron Injectable 4 milliGRAM(s) IV Push every 6 hours PRN  oxyCODONE    IR 5 milliGRAM(s) Oral every 4 hours PRN  oxyCODONE    IR 10 milliGRAM(s) Oral every 4 hours PRN    Anticoagulation:     OTHER:  docusate sodium 100 milliGRAM(s) Oral three times a day  magnesium hydroxide Suspension 30 milliLiter(s) Oral every 12 hours PRN  naloxone Injectable 0.1 milliGRAM(s) IV Push every 3 minutes PRN  senna 2 Tablet(s) Oral at bedtime    IVF:  ascorbic acid 500 milliGRAM(s) Oral two times a day  multivitamin 1 Tablet(s) Oral daily    CULTURES:  Culture Results:   No growth to date. ( @ 01:59)  Culture Results:   No growth to date. ( @ 01:59)    RADIOLOGY & ADDITIONAL TESTS:  none

## 2018-06-15 NOTE — CONSULT NOTE ADULT - ASSESSMENT
56 yo F hx chronic LBP s/p L3-L4 lumbar laminectomy and fusion (2011) now with progressive LBP associated with paresthesias s/p Lumbar fusion L2-S1,  L4-S1 PLIF and large L4-L5 synovial cyst removal on 6/12/18 post op course c/b anemia. Hematology consulted to r/o bleeding disorder given her degree of anemia and blood loss. 58 yo F hx chronic LBP s/p L3-L4 lumbar laminectomy and fusion (2011) now with progressive LBP associated with paresthesias s/p Lumbar fusion L2-S1,  L4-S1 PLIF and large L4-L5 synovial cyst removal on 6/12/18 post op course c/b anemia. Hematology consulted to r/o bleeding disorder given her degree of anemia and blood loss.     1. Anemia: most likely due to recent procedure and blood loss. She has received 3 units of pRBCs thus far and getting another unit today. Her Hgb on admission was 13.9 and is 7.0 today, normocytic, LDH and haptoglobin were normal making hemolysis less likely. She had normal PT and PTT as well on pre-op labs, today PT is slightly prolonged at 13.8, PTT normal 27.7 and INR of 1.22. Subclinical DIC with bleeding is a possibility.   - Peripheral blood smear reviewed: normocytic RBCs, no schistocytes or RBC fragments noted, no abnormal WBCs  - check mixing study, if PT corrects then would check factor 2, 7, 9, 10 to r/o vitamin K deficiency  - check D-dimer and fibrinogen as well  - check reticulocyte count, iron with TIBC and ferritin, vitamin B12, and Folate levels  - transfuse for Hgb < 7.0    2. Thrombocytopenia: pt with mild thrombocytopenia. Platelet count of 111k today, has also been having fevers, ? due to fever (infectious work up thus far negative), not on any medications that have been associated with thrombocytopenia, DIC is a possibility, however peripheral blood smear with no schistocytes and not consistent with DIC  - continue to monitor, work up as above    Plan d/w primary team    Jones Alvarez, PGY-4  Hematology-Oncology Fellow  Pager: [phone] ([facility]), [phone] ([facility])

## 2018-06-15 NOTE — CONSULT NOTE ADULT - PROBLEM SELECTOR RECOMMENDATION 9
- Afrin nasal saline sprays  - continue care per primary team - Afrin nasal saline sprays BID  - will perform Indirect laryngoscopy later today  - Tylenol for headaches  - continue care per primary team - Afrin nasal saline sprays BID X 3 days  - Normal saline Nasal sprays (Ocean spray)  - Tylenol for headaches  - can f/u in the office with Dr. Mccarthy/Xu once discharged [phone]  - continue care per primary team

## 2018-06-16 LAB
ANION GAP SERPL CALC-SCNC: 11 MMOL/L — SIGNIFICANT CHANGE UP (ref 5–17)
BUN SERPL-MCNC: 7 MG/DL — SIGNIFICANT CHANGE UP (ref 7–23)
CALCIUM SERPL-MCNC: 7.9 MG/DL — LOW (ref 8.4–10.5)
CHLORIDE SERPL-SCNC: 99 MMOL/L — SIGNIFICANT CHANGE UP (ref 96–108)
CO2 SERPL-SCNC: 25 MMOL/L — SIGNIFICANT CHANGE UP (ref 22–31)
CREAT SERPL-MCNC: 0.77 MG/DL — SIGNIFICANT CHANGE UP (ref 0.5–1.3)
D DIMER BLD IA.RAPID-MCNC: 2196 NG/ML DDU — HIGH
FERRITIN SERPL-MCNC: 181 NG/ML — HIGH (ref 15–150)
FIBRINOGEN PPP-MCNC: 1068 MG/DL — HIGH (ref 310–510)
GLUCOSE SERPL-MCNC: 112 MG/DL — HIGH (ref 70–99)
HCT VFR BLD CALC: 24 % — LOW (ref 34.5–45)
HGB BLD-MCNC: 8.6 G/DL — LOW (ref 11.5–15.5)
MCHC RBC-ENTMCNC: 33 PG — SIGNIFICANT CHANGE UP (ref 27–34)
MCHC RBC-ENTMCNC: 35.6 GM/DL — SIGNIFICANT CHANGE UP (ref 32–36)
MCV RBC AUTO: 92.7 FL — SIGNIFICANT CHANGE UP (ref 80–100)
PF4 HEPARIN CMPLX AB SER-ACNC: NEGATIVE — SIGNIFICANT CHANGE UP
PF4 HEPARIN CMPLX AB SERPL QL IA: 0.1 ABS — SIGNIFICANT CHANGE UP
PLATELET # BLD AUTO: 127 K/UL — LOW (ref 150–400)
POTASSIUM SERPL-MCNC: 3.9 MMOL/L — SIGNIFICANT CHANGE UP (ref 3.5–5.3)
POTASSIUM SERPL-SCNC: 3.9 MMOL/L — SIGNIFICANT CHANGE UP (ref 3.5–5.3)
RBC # BLD: 2.59 M/UL — LOW (ref 3.8–5.2)
RBC # BLD: 2.59 M/UL — LOW (ref 3.8–5.2)
RBC # FLD: 12.4 % — SIGNIFICANT CHANGE UP (ref 10.3–14.5)
RETICS #: 70.1 K/UL — SIGNIFICANT CHANGE UP (ref 25–125)
RETICS/RBC NFR: 2.7 % — HIGH (ref 0.5–2.5)
SODIUM SERPL-SCNC: 135 MMOL/L — SIGNIFICANT CHANGE UP (ref 135–145)
WBC # BLD: 9 K/UL — SIGNIFICANT CHANGE UP (ref 3.8–10.5)
WBC # FLD AUTO: 9 K/UL — SIGNIFICANT CHANGE UP (ref 3.8–10.5)

## 2018-06-16 PROCEDURE — 99233 SBSQ HOSP IP/OBS HIGH 50: CPT

## 2018-06-16 RX ADMIN — DULOXETINE HYDROCHLORIDE 60 MILLIGRAM(S): 30 CAPSULE, DELAYED RELEASE ORAL at 12:40

## 2018-06-16 RX ADMIN — OXYCODONE HYDROCHLORIDE 10 MILLIGRAM(S): 5 TABLET ORAL at 15:46

## 2018-06-16 RX ADMIN — OXYCODONE HYDROCHLORIDE 10 MILLIGRAM(S): 5 TABLET ORAL at 10:56

## 2018-06-16 RX ADMIN — SENNA PLUS 2 TABLET(S): 8.6 TABLET ORAL at 21:38

## 2018-06-16 RX ADMIN — Medication 5 MILLIGRAM(S): at 00:14

## 2018-06-16 RX ADMIN — Medication 500 MILLIGRAM(S): at 18:03

## 2018-06-16 RX ADMIN — Medication 650 MILLIGRAM(S): at 18:04

## 2018-06-16 RX ADMIN — Medication 5 MILLIGRAM(S): at 18:03

## 2018-06-16 RX ADMIN — OXYCODONE HYDROCHLORIDE 10 MILLIGRAM(S): 5 TABLET ORAL at 21:38

## 2018-06-16 RX ADMIN — OXYCODONE HYDROCHLORIDE 10 MILLIGRAM(S): 5 TABLET ORAL at 11:26

## 2018-06-16 RX ADMIN — OXYCODONE HYDROCHLORIDE 5 MILLIGRAM(S): 5 TABLET ORAL at 05:30

## 2018-06-16 RX ADMIN — OXYCODONE HYDROCHLORIDE 10 MILLIGRAM(S): 5 TABLET ORAL at 15:16

## 2018-06-16 RX ADMIN — OXYCODONE HYDROCHLORIDE 10 MILLIGRAM(S): 5 TABLET ORAL at 02:01

## 2018-06-16 RX ADMIN — Medication 500 MILLIGRAM(S): at 05:25

## 2018-06-16 RX ADMIN — Medication 650 MILLIGRAM(S): at 12:40

## 2018-06-16 RX ADMIN — OXYCODONE HYDROCHLORIDE 10 MILLIGRAM(S): 5 TABLET ORAL at 22:45

## 2018-06-16 RX ADMIN — Medication 100 MILLIGRAM(S): at 21:38

## 2018-06-16 RX ADMIN — OXYCODONE HYDROCHLORIDE 10 MILLIGRAM(S): 5 TABLET ORAL at 03:00

## 2018-06-16 RX ADMIN — Medication 1 TABLET(S): at 12:39

## 2018-06-16 RX ADMIN — Medication 100 MILLIGRAM(S): at 13:13

## 2018-06-16 RX ADMIN — Medication 100 MILLIGRAM(S): at 05:25

## 2018-06-16 RX ADMIN — Medication 5 MILLIGRAM(S): at 09:43

## 2018-06-16 RX ADMIN — Medication 650 MILLIGRAM(S): at 13:10

## 2018-06-16 RX ADMIN — OXYCODONE HYDROCHLORIDE 5 MILLIGRAM(S): 5 TABLET ORAL at 04:24

## 2018-06-16 RX ADMIN — Medication 650 MILLIGRAM(S): at 05:26

## 2018-06-16 NOTE — PROGRESS NOTE ADULT - PROBLEM SELECTOR PLAN 5
Corrected calcium for hypoalbuminemia is 8.6 (normal).   Monitor, check albumin and ionized calcium in AM.

## 2018-06-16 NOTE — PROGRESS NOTE ADULT - PROBLEM SELECTOR PLAN 1
fever likely her normal post-op course. Monitoring for s/s of infection. Thus far, no evidence of infection. CXR and UA normal. Blood cultures from 5/14 negative.   The surgical site does not look infected.   LE doppler negative for DVT.  Continue to monitor off antibiotics.   Incentive spirometry  Consider ID consult in future if continues to have fevers.

## 2018-06-16 NOTE — PROGRESS NOTE ADULT - ASSESSMENT
57 year old female with history of  Anxiety, chronic low back pain s/p  L3-L4 lumbar laminectomy and fusion  in 2011. Pt has been c/o progressive  low back pain that radiates down the right leg with associated numbness and tingling for 3 moths followed by neurology tried pain medication without any relief s/p MRI revealed Junctional stenosis ,Grade 1 spondylolisthesis ,right synovial cyst . Presents to [facility] for scheduled Removal of Lumbar hardware  L2-5 decompression  L2- 3  L4 -5 posterior Lumbar Fusion  L2 - 5 Fusion on 6/12/2018. (05 Jun 2018 11:03)    57 year old female with history of  Anxiety, chronic low back pain s/p  L3-L4 lumbar laminectomy and fusion  in 2011. Pt has been c/o progressive  low back pain that radiates down the right leg with associated numbness and tingling for 3 moths followed by neurology tried pain medication without any relief s/p MRI revealed Junctional stenosis ,Grade 1 spondylolisthesis ,right synovial cyst . Presents to [facility] for scheduled Removal of Lumbar hardware  L2-5 decompression  L2- 3  L4 -5 posterior Lumbar Fusion  L2 - 5 Fusion on 6/12/2018.     PROCEDURE: 6/12 s/p L4-S1 Posterior lateral interbody fusion, extension of fusion to L2, resection of synovial cyst, 1500 ml blood loss, s/p 3 u PRBC (2u intra-op, 1u post op), pt remained in the NSCU for close monitoring of blood pressure requiring multiple fluid boluses. s/p 1 u PRBC yesterday     POD#1    PLAN:  Neuro:   - monitor H/H- 8.6/24  - s/p 1 u PRBC yesterday  -low grade fever- workup negative so far, monitor off antibiotics  - Hospitalist following  - pain control- oxycodone prn , valium prn for spasm  - anxiety- continue cymbalta, (home dose xanax 0.5 bid)  - Heme consult appreciated  - thrombocytopenia- monitor platelet count  Respiratory:   - encouraged incentive spirometry  CV:  - vital signs stable  DVT ppx:   - venodynes,   - hold chemoprophylaxis due to recent blood loss, multiple transfusions  PT/OT:   - Home Pt w/RW      [facility] # [id number]

## 2018-06-16 NOTE — PROGRESS NOTE ADULT - ASSESSMENT
57F w Anxiety, chronic low back pain s/p  L3-L4 lumbar laminectomy and fusion  in 2011, thyroid nodule, ?aortic valve insufficiency a/w progressive low back pain that radiates down the right leg now s/p removal of lumbar hardware  L2-5 decompression  L2- 3  L4 -5 posterior lumbar fusion  L2 - 5 Fusion c/b blood loss anemia w/occ hypotension, and persistent fevers

## 2018-06-16 NOTE — PROGRESS NOTE ADULT - SUBJECTIVE AND OBJECTIVE BOX
SUBJECTIVE: Pt seen and examined, pt with low grade fever 100.8 this afternoon, cultures negative to date    OVERNIGHT EVENTS: none    Vital Signs Last 24 Hrs  T(C): 38.2 (16 Jun 2018 12:56), Max: 38.2 (16 Jun 2018 12:56)  T(F): 100.8 (16 Jun 2018 12:56), Max: 100.8 (16 Jun 2018 12:56)  HR: 87 (16 Jun 2018 12:56) (66 - 99)  BP: 111/72 (16 Jun 2018 12:56) (101/60 - 125/78)  BP(mean): --  RR: 18 (16 Jun 2018 12:56) (17 - 18)  SpO2: 99% (16 Jun 2018 12:56) (93% - 99%)    PHYSICAL EXAM:    General: No Acute Distress     Neurological: Awake, alert oriented to person, place and time, Following Commands, PERRL, EOMI, Face Symmetrical, Speech Fluent, Moving all extremities, Muscle Strength normal in all four extremities, No Drift, Sensation to Light Touch Intact    Pulmonary: Clear to Auscultation, No Rales, No Rhonchi, No Wheezes     Cardiovascular: S1, S2, Regular Rate and Rhythm     Gastrointestinal: Soft, Nontender, Nondistended     Incision: back incision c/d/i    LABS:                        8.6    9.0   )-----------( 127      ( 16 Jun 2018 06:41 )             24.0    06-16    135  |  99  |  7   ----------------------------<  112<H>  3.9   |  25  |  0.77    Ca    7.9<L>      16 Jun 2018 06:41    PT/INR - ( 15 Gordy 2018 17:14 )   PT: 13.1 sec;   INR: 1.20 ratio         PTT - ( 15 Gordy 2018 08:00 )  PTT:27.7 sec      06-15 @ 07:01  -  06-16 @ 07:00  --------------------------------------------------------  IN: 1620 mL / OUT: 0 mL / NET: 1620 mL    06-16 @ 07:01 - 06-16 @ 14:38  --------------------------------------------------------  IN: 720 mL / OUT: 0 mL / NET: 720 mL      DRAINS: none    MEDICATIONS:  Antibiotics:    Neuro:  acetaminophen   Tablet 650 milliGRAM(s) Oral every 6 hours PRN For Temp greater than 38 C (100.4 F)  acetaminophen   Tablet. 650 milliGRAM(s) Oral every 6 hours PRN Mild Pain (1 - 3)  diazepam    Tablet 5 milliGRAM(s) Oral every 8 hours PRN muscle spasm  diphenhydrAMINE   Capsule 25 milliGRAM(s) Oral every 4 hours PRN Pruritus  DULoxetine 60 milliGRAM(s) Oral daily  ondansetron Injectable 4 milliGRAM(s) IV Push every 6 hours PRN Nausea  oxyCODONE    IR 5 milliGRAM(s) Oral every 4 hours PRN Moderate Pain (4 - 6)  oxyCODONE    IR 10 milliGRAM(s) Oral every 4 hours PRN Severe Pain (7 - 10)    Cardiac:    Pulm:    GI/:  docusate sodium 100 milliGRAM(s) Oral three times a day  magnesium hydroxide Suspension 30 milliLiter(s) Oral every 12 hours PRN Constipation  senna 2 Tablet(s) Oral at bedtime    Other:   ascorbic acid 500 milliGRAM(s) Oral two times a day  multivitamin 1 Tablet(s) Oral daily  naloxone Injectable 0.1 milliGRAM(s) IV Push every 3 minutes PRN For ANY of the following changes in patient status:  A. RR LESS THAN 10 breaths per minute, B. Oxygen saturation LESS THAN 90%, C. Sedation score of 6    DIET: [x] Regular [] CCD [] Renal [] Puree [] Dysphagia [] Tube Feeds:     IMAGING:   < from: [facility] Dup[facility] Lower Ext Vein Scan, [place] (06.15.18 @ 09:37) >  IMPRESSION:     No evidence of bilateral lower extremity deep venous thrombosis.    < end of copied text >

## 2018-06-16 NOTE — PROGRESS NOTE ADULT - SUBJECTIVE AND OBJECTIVE BOX
Werner Rosario MD  ([facility] Hospitalist)  Pager [phone]  (During off hours please page [phone])      Patient is a 58y old  Female who presents with a chief complaint of "I need surgery for my lower back". (12 Jun 2018 07:16)      SUBJECTIVE / OVERNIGHT EVENTS: The patient continues to have low grade fevers. Denies rashes, dysuria, cough. States she generally has fever 3-4 days following any kind of surgery.     MEDICATIONS  (STANDING):  ascorbic acid 500 milliGRAM(s) Oral two times a day  docusate sodium 100 milliGRAM(s) Oral three times a day  DULoxetine 60 milliGRAM(s) Oral daily  multivitamin 1 Tablet(s) Oral daily  senna 2 Tablet(s) Oral at bedtime    MEDICATIONS  (PRN):  acetaminophen   Tablet 650 milliGRAM(s) Oral every 6 hours PRN For Temp greater than 38 C (100.4 F)  acetaminophen   Tablet. 650 milliGRAM(s) Oral every 6 hours PRN Mild Pain (1 - 3)  diazepam    Tablet 5 milliGRAM(s) Oral every 8 hours PRN muscle spasm  diphenhydrAMINE   Capsule 25 milliGRAM(s) Oral every 4 hours PRN Pruritus  magnesium hydroxide Suspension 30 milliLiter(s) Oral every 12 hours PRN Constipation  naloxone Injectable 0.1 milliGRAM(s) IV Push every 3 minutes PRN For ANY of the following changes in patient status:  A. RR LESS THAN 10 breaths per minute, B. Oxygen saturation LESS THAN 90%, C. Sedation score of 6  ondansetron Injectable 4 milliGRAM(s) IV Push every 6 hours PRN Nausea  oxyCODONE    IR 5 milliGRAM(s) Oral every 4 hours PRN Moderate Pain (4 - 6)  oxyCODONE    IR 10 milliGRAM(s) Oral every 4 hours PRN Severe Pain (7 - 10)      Vital Signs Last 24 Hrs  T(C): 38.2 (16 Jun 2018 12:56), Max: 38.2 (16 Jun 2018 12:56)  T(F): 100.8 (16 Jun 2018 12:56), Max: 100.8 (16 Jun 2018 12:56)  HR: 87 (16 Jun 2018 12:56) (66 - 99)  BP: 111/72 (16 Jun 2018 12:56) (101/60 - 125/78)  BP(mean): --  RR: 18 (16 Jun 2018 12:56) (17 - 18)  SpO2: 99% (16 Jun 2018 12:56) (93% - 99%)  CAPILLARY BLOOD GLUCOSE        I&O's Summary    15 Gordy 2018 07:01  -  16 Jun 2018 07:00  --------------------------------------------------------  IN: 1620 mL / OUT: 0 mL / NET: 1620 mL    16 Jun 2018 07:01  -  16 Jun 2018 15:07  --------------------------------------------------------  IN: 720 mL / OUT: 0 mL / NET: 720 mL        PHYSICAL EXAM:  GENERAL: NAD, calm  HEAD:  Atraumatic, Normocephalic  EYES: EOMI, conjunctiva and sclera clear  ENMT: Moist mucous membranes  NECK: Supple, No JVD  RESPIRATORY: Clear to auscultation bilaterally; No rales, rhonchi, wheezing, or rubs  CARDIOVASCULAR: Regular rate and rhythm; S1, S2, No murmurs, rubs, or gallops  GASTROINTESTINAL: Soft, Nontender, Nondistended; Bowel sounds present  EXTREMITIES:  No edema  NEURO:  Alert & Oriented X3; Moving all 4 extremities; No gross sensory deficits  SKIN: lumbar staples in place post-op - area appears clean, dry, intact. No evidence of cellulitis.       LABS:                        8.6    9.0   )-----------( 127      ( 16 Jun 2018 06:41 )             24.0     06-16    135  |  99  |  7   ----------------------------<  112<H>  3.9   |  25  |  0.77    Ca    7.9<L>      16 Jun 2018 06:41    PT/INR - ( 15 Gordy 2018 17:14 )   PT: 13.1 sec;   INR: 1.20 ratio    PTT - ( 15 Gordy 2018 08:00 )  PTT:27.7 sec      RADIOLOGY & ADDITIONAL TESTS:    Imaging Personally Reviewed:   YOUSIF Duplex - no DVTs in lower extremities     Consultant(s) Notes Reviewed: ENT, heme/one, neurosurgery     Care Discussed with Consultants/Other Providers: neurosurgery  re fever work up

## 2018-06-17 LAB
ALBUMIN SERPL ELPH-MCNC: 2.5 G/DL — LOW (ref 3.3–5)
ANION GAP SERPL CALC-SCNC: 8 MMOL/L — SIGNIFICANT CHANGE UP (ref 5–17)
APPEARANCE UR: CLEAR — SIGNIFICANT CHANGE UP
APPEARANCE UR: CLEAR — SIGNIFICANT CHANGE UP
BILIRUB UR-MCNC: NEGATIVE — SIGNIFICANT CHANGE UP
BILIRUB UR-MCNC: NEGATIVE — SIGNIFICANT CHANGE UP
BUN SERPL-MCNC: 7 MG/DL — SIGNIFICANT CHANGE UP (ref 7–23)
CA-I BLD-SCNC: 1.15 MMOL/L — SIGNIFICANT CHANGE UP (ref 1.12–1.3)
CALCIUM SERPL-MCNC: 8.1 MG/DL — LOW (ref 8.4–10.5)
CHLORIDE SERPL-SCNC: 98 MMOL/L — SIGNIFICANT CHANGE UP (ref 96–108)
CO2 SERPL-SCNC: 29 MMOL/L — SIGNIFICANT CHANGE UP (ref 22–31)
COLOR SPEC: SIGNIFICANT CHANGE UP
COLOR SPEC: YELLOW — SIGNIFICANT CHANGE UP
CREAT SERPL-MCNC: 0.82 MG/DL — SIGNIFICANT CHANGE UP (ref 0.5–1.3)
CRP SERPL-MCNC: 13.9 MG/DL — HIGH (ref 0–0.4)
DIFF PNL FLD: NEGATIVE — SIGNIFICANT CHANGE UP
DIFF PNL FLD: NEGATIVE — SIGNIFICANT CHANGE UP
ERYTHROCYTE [SEDIMENTATION RATE] IN BLOOD: 94 MM/HR — HIGH (ref 0–20)
GLUCOSE SERPL-MCNC: 117 MG/DL — HIGH (ref 70–99)
GLUCOSE UR QL: NEGATIVE — SIGNIFICANT CHANGE UP
GLUCOSE UR QL: NEGATIVE — SIGNIFICANT CHANGE UP
HCT VFR BLD CALC: 22.4 % — LOW (ref 34.5–45)
HGB BLD-MCNC: 8 G/DL — LOW (ref 11.5–15.5)
KETONES UR-MCNC: NEGATIVE — SIGNIFICANT CHANGE UP
KETONES UR-MCNC: NEGATIVE — SIGNIFICANT CHANGE UP
LEUKOCYTE ESTERASE UR-ACNC: NEGATIVE — SIGNIFICANT CHANGE UP
LEUKOCYTE ESTERASE UR-ACNC: NEGATIVE — SIGNIFICANT CHANGE UP
MCHC RBC-ENTMCNC: 31.6 PG — SIGNIFICANT CHANGE UP (ref 27–34)
MCHC RBC-ENTMCNC: 35.7 GM/DL — SIGNIFICANT CHANGE UP (ref 32–36)
MCV RBC AUTO: 88.5 FL — SIGNIFICANT CHANGE UP (ref 80–100)
NITRITE UR-MCNC: NEGATIVE — SIGNIFICANT CHANGE UP
NITRITE UR-MCNC: NEGATIVE — SIGNIFICANT CHANGE UP
PH UR: 6.5 — SIGNIFICANT CHANGE UP (ref 5–8)
PH UR: 7.5 — SIGNIFICANT CHANGE UP (ref 5–8)
PLATELET # BLD AUTO: 145 K/UL — LOW (ref 150–400)
POTASSIUM SERPL-MCNC: 4.4 MMOL/L — SIGNIFICANT CHANGE UP (ref 3.5–5.3)
POTASSIUM SERPL-SCNC: 4.4 MMOL/L — SIGNIFICANT CHANGE UP (ref 3.5–5.3)
PROCALCITONIN SERPL-MCNC: 0.11 NG/ML — HIGH (ref 0.02–0.1)
PROT UR-MCNC: NEGATIVE — SIGNIFICANT CHANGE UP
PROT UR-MCNC: NEGATIVE — SIGNIFICANT CHANGE UP
RBC # BLD: 2.53 M/UL — LOW (ref 3.8–5.2)
RBC # FLD: 14 % — SIGNIFICANT CHANGE UP (ref 10.3–14.5)
SODIUM SERPL-SCNC: 135 MMOL/L — SIGNIFICANT CHANGE UP (ref 135–145)
SP GR SPEC: 1.01 — LOW (ref 1.01–1.02)
SP GR SPEC: 1.01 — LOW (ref 1.01–1.02)
UROBILINOGEN FLD QL: NEGATIVE — SIGNIFICANT CHANGE UP
UROBILINOGEN FLD QL: NEGATIVE — SIGNIFICANT CHANGE UP
WBC # BLD: 5.8 K/UL — SIGNIFICANT CHANGE UP (ref 3.8–10.5)
WBC # FLD AUTO: 5.8 K/UL — SIGNIFICANT CHANGE UP (ref 3.8–10.5)
WBC UR QL: SIGNIFICANT CHANGE UP /HPF (ref 0–5)

## 2018-06-17 PROCEDURE — 74177 CT ABD & PELVIS W/CONTRAST: CPT | Mod: 26

## 2018-06-17 PROCEDURE — 99233 SBSQ HOSP IP/OBS HIGH 50: CPT

## 2018-06-17 PROCEDURE — 71260 CT THORAX DX C+: CPT | Mod: 26

## 2018-06-17 RX ORDER — SUMATRIPTAN SUCCINATE 4 MG/.5ML
25 INJECTION, SOLUTION SUBCUTANEOUS ONCE
Qty: 0 | Refills: 0 | Status: COMPLETED | OUTPATIENT
Start: 2018-06-17 | End: 2018-06-17

## 2018-06-17 RX ORDER — ENOXAPARIN SODIUM 100 MG/ML
40 INJECTION SUBCUTANEOUS EVERY 24 HOURS
Qty: 0 | Refills: 0 | Status: DISCONTINUED | OUTPATIENT
Start: 2018-06-17 | End: 2018-06-20

## 2018-06-17 RX ORDER — DEXTROSE MONOHYDRATE, SODIUM CHLORIDE, AND POTASSIUM CHLORIDE 50; .745; 4.5 G/1000ML; G/1000ML; G/1000ML
1000 INJECTION, SOLUTION INTRAVENOUS
Qty: 0 | Refills: 0 | Status: DISCONTINUED | OUTPATIENT
Start: 2018-06-17 | End: 2018-06-18

## 2018-06-17 RX ADMIN — Medication 500 MILLIGRAM(S): at 05:34

## 2018-06-17 RX ADMIN — OXYCODONE HYDROCHLORIDE 10 MILLIGRAM(S): 5 TABLET ORAL at 09:33

## 2018-06-17 RX ADMIN — OXYCODONE HYDROCHLORIDE 5 MILLIGRAM(S): 5 TABLET ORAL at 06:30

## 2018-06-17 RX ADMIN — Medication 1 TABLET(S): at 13:26

## 2018-06-17 RX ADMIN — Medication 100 MILLIGRAM(S): at 05:34

## 2018-06-17 RX ADMIN — Medication 100 MILLIGRAM(S): at 13:26

## 2018-06-17 RX ADMIN — Medication 100 MILLIGRAM(S): at 21:37

## 2018-06-17 RX ADMIN — SENNA PLUS 2 TABLET(S): 8.6 TABLET ORAL at 21:37

## 2018-06-17 RX ADMIN — Medication 500 MILLIGRAM(S): at 17:06

## 2018-06-17 RX ADMIN — OXYCODONE HYDROCHLORIDE 10 MILLIGRAM(S): 5 TABLET ORAL at 05:30

## 2018-06-17 RX ADMIN — OXYCODONE HYDROCHLORIDE 10 MILLIGRAM(S): 5 TABLET ORAL at 09:03

## 2018-06-17 RX ADMIN — SUMATRIPTAN SUCCINATE 25 MILLIGRAM(S): 4 INJECTION, SOLUTION SUBCUTANEOUS at 22:57

## 2018-06-17 RX ADMIN — Medication 5 MILLIGRAM(S): at 13:26

## 2018-06-17 RX ADMIN — DULOXETINE HYDROCHLORIDE 60 MILLIGRAM(S): 30 CAPSULE, DELAYED RELEASE ORAL at 13:26

## 2018-06-17 RX ADMIN — Medication 5 MILLIGRAM(S): at 21:38

## 2018-06-17 RX ADMIN — SUMATRIPTAN SUCCINATE 25 MILLIGRAM(S): 4 INJECTION, SOLUTION SUBCUTANEOUS at 23:00

## 2018-06-17 RX ADMIN — Medication 5 MILLIGRAM(S): at 02:04

## 2018-06-17 RX ADMIN — Medication 650 MILLIGRAM(S): at 20:32

## 2018-06-17 RX ADMIN — OXYCODONE HYDROCHLORIDE 5 MILLIGRAM(S): 5 TABLET ORAL at 05:34

## 2018-06-17 RX ADMIN — OXYCODONE HYDROCHLORIDE 10 MILLIGRAM(S): 5 TABLET ORAL at 04:23

## 2018-06-17 RX ADMIN — OXYCODONE HYDROCHLORIDE 10 MILLIGRAM(S): 5 TABLET ORAL at 17:07

## 2018-06-17 RX ADMIN — OXYCODONE HYDROCHLORIDE 10 MILLIGRAM(S): 5 TABLET ORAL at 17:37

## 2018-06-17 NOTE — PROGRESS NOTE ADULT - PROBLEM SELECTOR PLAN 1
The patients states that fevers are a part of her normal post-op course.   Infectious work up is negative thus far, no obvious s/s of infection. However, she has a very high ESR and CRP (differential is infection, malignancy, or new autoimmune disease).   Ordered for CT C/A/P with IV contrast.   CXR and UA normal. Blood cultures from 5/14 negative.   The surgical site does not look infected.   LE doppler negative for DVT.  Continue to monitor off antibiotics.   Incentive spirometry  ID consult called, pending.

## 2018-06-17 NOTE — PROGRESS NOTE ADULT - SUBJECTIVE AND OBJECTIVE BOX
SUBJECTIVE: Pt seen and examined, spiked multiple fevers overnight, no source identified.     OVERNIGHT EVENTS: none    Vital Signs Last 24 Hrs  T(C): 38.6 (17 Jun 2018 05:10), Max: 38.6 (17 Jun 2018 05:10)  T(F): 101.5 (17 Jun 2018 05:10), Max: 101.5 (17 Jun 2018 05:10)  HR: 88 (17 Jun 2018 10:30) (87 - 98)  BP: 91/60 (17 Jun 2018 10:30) (90/54 - 111/72)  BP(mean): --  RR: 18 (17 Jun 2018 05:10) (17 - 18)  SpO2: 94% (17 Jun 2018 05:10) (94% - 99%)    PHYSICAL EXAM:    General: No Acute Distress     Neurological: Awake, alert oriented to person, place and time, Following Commands, PERRL, EOMI, Face Symmetrical, Speech Fluent, Moving all extremities, Muscle Strength normal in all four extremities, No Drift, Sensation to Light Touch Intact    Pulmonary: Clear to Auscultation, No Rales, No Rhonchi, No Wheezes     Cardiovascular: S1, S2, Regular Rate and Rhythm     Gastrointestinal: Soft, Nontender, Nondistended     Incision: back incision c/d/i    LABS:                                            8.0    5.80  )-----------( 145      ( 17 Jun 2018 08:11 )             22.4   06-17    135  |  98  |  7   ----------------------------<  117<H>  4.4   |  29  |  0.82    Ca    8.1<L>      17 Jun 2018 06:55    TPro  x   /  Alb  2.5<L>  /  TBili  x   /  DBili  x   /  AST  x   /  ALT  x   /  AlkPhos  x   06-17      DRAINS: none    MEDICATIONS:  Antibiotics:    Neuro:  acetaminophen   Tablet 650 milliGRAM(s) Oral every 6 hours PRN For Temp greater than 38 C (100.4 F)  acetaminophen   Tablet. 650 milliGRAM(s) Oral every 6 hours PRN Mild Pain (1 - 3)  diazepam    Tablet 5 milliGRAM(s) Oral every 8 hours PRN muscle spasm  diphenhydrAMINE   Capsule 25 milliGRAM(s) Oral every 4 hours PRN Pruritus  DULoxetine 60 milliGRAM(s) Oral daily  ondansetron Injectable 4 milliGRAM(s) IV Push every 6 hours PRN Nausea  oxyCODONE    IR 5 milliGRAM(s) Oral every 4 hours PRN Moderate Pain (4 - 6)  oxyCODONE    IR 10 milliGRAM(s) Oral every 4 hours PRN Severe Pain (7 - 10)    Cardiac:    Pulm:    GI/:  docusate sodium 100 milliGRAM(s) Oral three times a day  magnesium hydroxide Suspension 30 milliLiter(s) Oral every 12 hours PRN Constipation  senna 2 Tablet(s) Oral at bedtime    Other:   ascorbic acid 500 milliGRAM(s) Oral two times a day  multivitamin 1 Tablet(s) Oral daily  naloxone Injectable 0.1 milliGRAM(s) IV Push every 3 minutes PRN For ANY of the following changes in patient status:  A. RR LESS THAN 10 breaths per minute, B. Oxygen saturation LESS THAN 90%, C. Sedation score of 6    DIET: [x] Regular [] CCD [] Renal [] Puree [] Dysphagia [] Tube Feeds:     IMAGING:   < from: [facility] Duplex Lower Ext Vein Scan, [place] (06.15.18 @ 09:37) >  IMPRESSION:     No evidence of bilateral lower extremity deep venous thrombosis.    < end of copied text > 3

## 2018-06-17 NOTE — PROGRESS NOTE ADULT - SUBJECTIVE AND OBJECTIVE BOX
Werner Rosario MD  ([facility] Hospitalist)  Pager [phone]  (During off hours please page [phone])      Patient is a 58y old  Female who presents with a chief complaint of "I need surgery for my lower back". (2018 07:16)      SUBJECTIVE / OVERNIGHT EVENTS: The patient continues to be febrile. Denies pain, diarrhea, cough, SOB, dysuria.     MEDICATIONS  (STANDING):  ascorbic acid 500 milliGRAM(s) Oral two times a day  docusate sodium 100 milliGRAM(s) Oral three times a day  DULoxetine 60 milliGRAM(s) Oral daily  multivitamin 1 Tablet(s) Oral daily  senna 2 Tablet(s) Oral at bedtime  sodium chloride 0.9% with potassium chloride 20 mEq/L 1000 milliLiter(s) (75 mL/Hr) IV Continuous <Continuous>    MEDICATIONS  (PRN):  acetaminophen   Tablet 650 milliGRAM(s) Oral every 6 hours PRN For Temp greater than 38 C (100.4 F)  acetaminophen   Tablet. 650 milliGRAM(s) Oral every 6 hours PRN Mild Pain (1 - 3)  diazepam    Tablet 5 milliGRAM(s) Oral every 8 hours PRN muscle spasm  diphenhydrAMINE   Capsule 25 milliGRAM(s) Oral every 4 hours PRN Pruritus  magnesium hydroxide Suspension 30 milliLiter(s) Oral every 12 hours PRN Constipation  naloxone Injectable 0.1 milliGRAM(s) IV Push every 3 minutes PRN For ANY of the following changes in patient status:  A. RR LESS THAN 10 breaths per minute, B. Oxygen saturation LESS THAN 90%, C. Sedation score of 6  ondansetron Injectable 4 milliGRAM(s) IV Push every 6 hours PRN Nausea  oxyCODONE    IR 5 milliGRAM(s) Oral every 4 hours PRN Moderate Pain (4 - 6)  oxyCODONE    IR 10 milliGRAM(s) Oral every 4 hours PRN Severe Pain (7 - 10)      Vital Signs Last 24 Hrs  T(C): 37.2 (2018 09:08), Max: 38.6 (2018 05:10)  T(F): 99 (2018 09:08), Max: 101.5 (2018 05:10)  HR: 88 (2018 10:30) (87 - 98)  BP: 91/60 (2018 10:30) (90/54 - 111/72)  BP(mean): --  RR: 18 (2018 09:08) (17 - 18)  SpO2: 95% (2018 09:08) (94% - 99%)  CAPILLARY BLOOD GLUCOSE        I&O's Summary    2018 07:01  -  2018 07:00  --------------------------------------------------------  IN: 1220 mL / OUT: 0 mL / NET: 1220 mL        PHYSICAL EXAM:  GENERAL: NAD, calm  HEAD:  Atraumatic, Normocephalic  EYES: EOMI, conjunctiva and sclera clear  ENMT: Moist mucous membranes  NECK: Supple, No JVD  RESPIRATORY: Clear to auscultation bilaterally; No rales, rhonchi, wheezing, or rubs  CARDIOVASCULAR: Regular rate and rhythm; S1, S2, No murmurs, rubs, or gallops  GASTROINTESTINAL: Soft, Nontender, Nondistended; Bowel sounds present  EXTREMITIES:  No edema  NEURO:  Alert & Oriented X3; Moving all 4 extremities; No gross sensory deficits  SKIN: lumbar staples in place post-op - area appears clean, dry, intact. No evidence of cellulitis.       LABS:                        8.0    5.80  )-----------( 145      ( 2018 08:11 )             22.4     06-    135  |  98  |  7   ----------------------------<  117<H>  4.4   |  29  |  0.82    Ca    8.1<L>      2018 06:55    TPro  x   /  Alb  2.5<L>  /  TBili  x   /  DBili  x   /  AST  x   /  ALT  x   /  AlkPhos  x     PT/INR - ( 15 Gordy 2018 17:14 )   PT: 13.1 sec;   INR: 1.20 ratio        Urinalysis Basic - ( 2018 06:55 )    Color: Yellow / Appearance: Clear / S.007 / pH: x  Gluc: x / Ketone: Negative  / Bili: Negative / Urobili: Negative   Blood: x / Protein: Negative / Nitrite: Negative   Leuk Esterase: Negative / RBC: x / WBC x   Sq Epi: x / Non Sq Epi: x / Bacteria: x        RADIOLOGY & ADDITIONAL TESTS:    Imaging Personally Reviewed:   Ordered CT C/A/P for fever work up    Consultant(s) Notes Reviewed: Neurosurgery     Care Discussed with Consultants/Other Providers: Neurosurgery - re plan of care

## 2018-06-17 NOTE — CONSULT NOTE ADULT - SUBJECTIVE AND OBJECTIVE BOX
HPI:   Patient is a 58y female who underwent lumbar spine lami and fusion in  but having pain of late so underwent elective franco, repeat spine fusion and cyst removal on . She lost 1500cc of blood and needed several transfusions intra and post op. She developed fever starting within 1 day of surgery. Her spine drains are out and her wound is doing well as is her back pain. She has no cough, sob, n,v,d, dysuria, sore throat , chest pain , abdo pain, limb pain. She felt some sinus congestion with headache but no positional headache or drainage from spine wound. She has negative cultures to date. We are called. per heme she is not hemolyzing. She reports having fever every time she has surgery. She was in [place] recently and had some dental work done in the past year but no tooth ache now. She felt fine except for back pain pre op.    REVIEW OF SYSTEMS:  All other review of systems negative (Comprehensive ROS)    PAST MEDICAL & SURGICAL HISTORY:  Spondylosis without myelopathy or radiculopathy, lumbar region  Chronic Low Back Pain: since 10/2010  Aortic Regurgitation: mild - as per last Echo - -no change  Anxiety  Lumbar Disc Disorder  Thyroid Nodule: benign- last thyroid BX  follow endocrinologist  U/S every year  MVP (Mitral Valve Prolapse)  H/O meniscectomy of right knee:   History of laminectomy:   Lumbar Synovial Cyst  Excision of Scalp Cyst:   S/P Laparoscopic Procedure: for Infertility  Left Breast Cyst: - benign  S/P Dilatation and Curettage:  for miscarriage  S/P  Section: x3      Allergies    No Known Allergies    Intolerances        Antimicrobials Day #  :    Other Medications:  acetaminophen   Tablet 650 milliGRAM(s) Oral every 6 hours PRN  acetaminophen   Tablet. 650 milliGRAM(s) Oral every 6 hours PRN  ascorbic acid 500 milliGRAM(s) Oral two times a day  diazepam    Tablet 5 milliGRAM(s) Oral every 8 hours PRN  diphenhydrAMINE   Capsule 25 milliGRAM(s) Oral every 4 hours PRN  docusate sodium 100 milliGRAM(s) Oral three times a day  DULoxetine 60 milliGRAM(s) Oral daily  magnesium hydroxide Suspension 30 milliLiter(s) Oral every 12 hours PRN  multivitamin 1 Tablet(s) Oral daily  naloxone Injectable 0.1 milliGRAM(s) IV Push every 3 minutes PRN  ondansetron Injectable 4 milliGRAM(s) IV Push every 6 hours PRN  oxyCODONE    IR 5 milliGRAM(s) Oral every 4 hours PRN  oxyCODONE    IR 10 milliGRAM(s) Oral every 4 hours PRN  senna 2 Tablet(s) Oral at bedtime  sodium chloride 0.9% with potassium chloride 20 mEq/L 1000 milliLiter(s) IV Continuous <Continuous>      FAMILY HISTORY:      SOCIAL HISTORY:  Smoking: [ ]Yes [ x]No  ETOH: [ ]Yes [ x]No  Drug Use: [ ]Yes [ x]No   [x ] Single[ ]    T(F): 99.4 (18 @ 12:47), Max: 101.5 (18 @ 05:10)  HR: 99 (18 @ 13:25)  BP: 116/72 (18 @ 13:25)  RR: 16 (18 @ 12:47)  SpO2: 97% (18 @ 12:47)  Wt(kg): --    PHYSICAL EXAM:  General: alert, no acute distress  Eyes:  anicteric, no conjunctival injection, no discharge  Oropharynx: no lesions or injection 	  Neck: supple, without adenopathy  Lungs: clear to auscultation  Heart: regular rate and rhythm; no murmur, rubs or gallops  Abdomen: soft, nondistended, nontender, without mass or organomegaly  Skin: no lesions  Extremities: no clubbing, cyanosis, or edema  Neurologic: alert, oriented, moves all extremities  spine wound intact, dry, flat, not tender  LAB RESULTS:                        8.0    5.80  )-----------( 145      ( 2018 08:11 )             22.4         135  |  98  |  7   ----------------------------<  117<H>  4.4   |  29  |  0.82    Ca    8.1<L>      2018 06:55    TPro  x   /  Alb  2.5<L>  /  TBili  x   /  DBili  x   /  AST  x   /  ALT  x   /  AlkPhos  x       LIVER FUNCTIONS - ( 2018 06:55 )  Alb: 2.5 g/dL / Pro: x     / ALK PHOS: x     / ALT: x     / AST: x     / GGT: x           Urinalysis Basic - ( 2018 06:55 )    Color: Yellow / Appearance: Clear / S.007 / pH: x  Gluc: x / Ketone: Negative  / Bili: Negative / Urobili: Negative   Blood: x / Protein: Negative / Nitrite: Negative   Leuk Esterase: Negative / RBC: x / WBC x   Sq Epi: x / Non Sq Epi: x / Bacteria: x        MICROBIOLOGY:  RECENT CULTURES:   @ 01:59 .Blood Blood-Venous     No growth to date.            RADIOLOGY REVIEWED:  < from: CT Abdomen and Pelvis w/ Oral Cont and w/ IV Cont (18 @ 12:39) >  EXAM:  CT ABDOMEN AND PELVIS OC IC                          EXAM:  CT CHEST IC                            PROCEDURE DATE:  2018            INTERPRETATION:  CLINICAL INFORMATION: Postop fever status post spinal   surgery 2018.    COMPARISON: None.    PROCEDURE:   CT of the Chest, Abdomen and Pelvis was performed with intravenous   contrast.   Intravenous contrast: 90 ml Omnipaque 350. 10 ml discarded.  Oral contrast: positive contrast was administered.  Sagittal and coronal reformats were performed.    FINDINGS:    CHEST:     LUNGS AND LARGE AIRWAYS: Patent central airways. No pulmonary nodules.  PLEURA: Small right pleural effusion and subsegmental bibasilar   atelectasis.  VESSELS: Within normal limits.  HEART: Heart size is normal. No pericardial effusion.  MEDIASTINUM AND KWAME: No lymphadenopathy.  CHEST WALL AND LOWER NECK: Within normal limits.    ABDOMEN AND PELVIS:    LIVER: Within normal limits.  BILE DUCTS: Normal caliber.  GALLBLADDER: Within normal limits.  SPLEEN:Within normal limits.  PANCREAS: Within normal limits.  ADRENALS: Within normal limits.  KIDNEYS/URETERS: Within normal limits.    BLADDER: Within normal limits.  REPRODUCTIVE ORGANS: Fibroid uterus. Adnexa within normal limits.    BOWEL: No bowel obstruction. Appendix is normal.  PERITONEUM: No ascites.  VESSELS:  Within normal limits.  RETROPERITONEUM: Evaluation is limited secondary to beam hardening   artifact from spinal hardware. No bulky lymphadenopathy.    ABDOMINAL WALL: Within normal limits.  BONES: Status post posterior fusion of L2-S1. Several tiny foci of gas in   the region of recent spinal surgery.     IMPRESSION:   Postoperative changes. Small right pleural effusion and subsegmental   bibasilar atelectasis.    Etiology of patient's fever is not identified on the current examination.     < from: [facility] Duplex Lower Ext Vein Scan, Bilat (06.15.18 @ 09:37) >  IMPRESSION:     No evidence of bilateral lower extremity deep venous thrombosis.    < end of copied text >              SHAWN GARCIA M.D., RADIOLOGY FELLOW  This document has been electronically signed.  TUTU AVINA M.D., ATTENDING RADIOLOGIST  This document has been electronically signed. 2018  1:37PM          < end of copied text >          Impression:  Patient s/p elective spine franco, fusion, cyst removal with large volume blood loss and multiple transfusions with fever post op . No localizing finding on exam, by symptoms or imaging and negative cultures. She looks totally nontoxic and does not get chills. I suspect fever is just related to post op blood in tissue, surgical manipulation and maybe from the transfusions.     Recommendations:  Monitor off antibiotics  Follow up final cultures  Monitor wound for leak but given ha not positional doubt csf leak

## 2018-06-17 NOTE — PROVIDER CONTACT NOTE (OTHER) - SITUATION
patient complaining she is experiencing an optic migraine. that she has had them in the past. That she is seeing kyldoscopes.

## 2018-06-17 NOTE — PROGRESS NOTE ADULT - ASSESSMENT
57 year old female with history of  Anxiety, chronic low back pain s/p  L3-L4 lumbar laminectomy and fusion  in 2011. Pt has been c/o progressive  low back pain that radiates down the right leg with associated numbness and tingling for 3 moths followed by neurology tried pain medication without any relief s/p MRI revealed Junctional stenosis ,Grade 1 spondylolisthesis ,right synovial cyst . Presents to [facility] for scheduled Removal of Lumbar hardware  L2-5 decompression  L2- 3  L4 -5 posterior Lumbar Fusion  L2 - 5 Fusion on 6/12/2018. (05 Jun 2018 11:03)    57 year old female with history of  Anxiety, chronic low back pain s/p  L3-L4 lumbar laminectomy and fusion  in 2011. Pt has been c/o progressive  low back pain that radiates down the right leg with associated numbness and tingling for 3 moths followed by neurology tried pain medication without any relief s/p MRI revealed Junctional stenosis ,Grade 1 spondylolisthesis ,right synovial cyst . Presents to [facility] for scheduled Removal of Lumbar hardware  L2-5 decompression  L2- 3  L4 -5 posterior Lumbar Fusion  L2 - 5 Fusion on 6/12/2018.     PROCEDURE: 6/12 s/p L4-S1 Posterior lateral interbody fusion, extension of fusion to L2, resection of synovial cyst, 1500 ml blood loss, s/p 3 u PRBC (2u intra-op, 1u post op), pt remained in the NSCU for close monitoring of blood pressure requiring multiple fluid boluses. s/p 1 u PRBC yesterday     POD#2    PLAN:  Neuro:   - spiked multiple fevers overnight, will consult ID for input regarding starting antibiotics. Pending CT C/A/P   - Hospitalist following  - pain control- oxycodone prn , valium prn for spasm  - anxiety- continue cymbalta, (home dose xanax 0.5 bid)  - Heme consult appreciated  - thrombocytopenia- monitor platelet count  Respiratory:   - encouraged incentive spirometry  CV:  - vital signs stable  DVT ppx:   - venodynes,   - hold chemoprophylaxis due to recent blood loss, multiple transfusions  PT/OT:   - Home Pt w/RW

## 2018-06-18 DIAGNOSIS — G43.109 MIGRAINE WITH AURA, NOT INTRACTABLE, WITHOUT STATUS MIGRAINOSUS: ICD-10-CM

## 2018-06-18 LAB
ANION GAP SERPL CALC-SCNC: 14 MMOL/L — SIGNIFICANT CHANGE UP (ref 5–17)
BASOPHILS # BLD AUTO: 0.01 K/UL — SIGNIFICANT CHANGE UP (ref 0–0.2)
BASOPHILS NFR BLD AUTO: 0.2 % — SIGNIFICANT CHANGE UP (ref 0–2)
BUN SERPL-MCNC: 10 MG/DL — SIGNIFICANT CHANGE UP (ref 7–23)
CALCIUM SERPL-MCNC: 8.5 MG/DL — SIGNIFICANT CHANGE UP (ref 8.4–10.5)
CHLORIDE SERPL-SCNC: 98 MMOL/L — SIGNIFICANT CHANGE UP (ref 96–108)
CO2 SERPL-SCNC: 26 MMOL/L — SIGNIFICANT CHANGE UP (ref 22–31)
CREAT SERPL-MCNC: 0.78 MG/DL — SIGNIFICANT CHANGE UP (ref 0.5–1.3)
EOSINOPHIL # BLD AUTO: 0.29 K/UL — SIGNIFICANT CHANGE UP (ref 0–0.5)
EOSINOPHIL NFR BLD AUTO: 4.7 % — SIGNIFICANT CHANGE UP (ref 0–6)
ERYTHROCYTE [SEDIMENTATION RATE] IN BLOOD: 60 MM/HR — HIGH (ref 0–20)
GLUCOSE SERPL-MCNC: 100 MG/DL — HIGH (ref 70–99)
HCT VFR BLD CALC: 26.5 % — LOW (ref 34.5–45)
HGB BLD-MCNC: 9.1 G/DL — LOW (ref 11.5–15.5)
IMM GRANULOCYTES NFR BLD AUTO: 0.5 % — SIGNIFICANT CHANGE UP (ref 0–1.5)
LYMPHOCYTES # BLD AUTO: 1.46 K/UL — SIGNIFICANT CHANGE UP (ref 1–3.3)
LYMPHOCYTES # BLD AUTO: 23.9 % — SIGNIFICANT CHANGE UP (ref 13–44)
MCHC RBC-ENTMCNC: 31.2 PG — SIGNIFICANT CHANGE UP (ref 27–34)
MCHC RBC-ENTMCNC: 34.3 GM/DL — SIGNIFICANT CHANGE UP (ref 32–36)
MCV RBC AUTO: 90.8 FL — SIGNIFICANT CHANGE UP (ref 80–100)
MONOCYTES # BLD AUTO: 0.86 K/UL — SIGNIFICANT CHANGE UP (ref 0–0.9)
MONOCYTES NFR BLD AUTO: 14.1 % — HIGH (ref 2–14)
NEUTROPHILS # BLD AUTO: 3.47 K/UL — SIGNIFICANT CHANGE UP (ref 1.8–7.4)
NEUTROPHILS NFR BLD AUTO: 56.6 % — SIGNIFICANT CHANGE UP (ref 43–77)
PHOSPHATE SERPL-MCNC: 4.5 MG/DL — SIGNIFICANT CHANGE UP (ref 2.5–4.5)
PLATELET # BLD AUTO: 206 K/UL — SIGNIFICANT CHANGE UP (ref 150–400)
POTASSIUM SERPL-MCNC: 4.3 MMOL/L — SIGNIFICANT CHANGE UP (ref 3.5–5.3)
POTASSIUM SERPL-SCNC: 4.3 MMOL/L — SIGNIFICANT CHANGE UP (ref 3.5–5.3)
RBC # BLD: 2.92 M/UL — LOW (ref 3.8–5.2)
RBC # FLD: 13.6 % — SIGNIFICANT CHANGE UP (ref 10.3–14.5)
SODIUM SERPL-SCNC: 138 MMOL/L — SIGNIFICANT CHANGE UP (ref 135–145)
WBC # BLD: 6.12 K/UL — SIGNIFICANT CHANGE UP (ref 3.8–10.5)
WBC # FLD AUTO: 6.12 K/UL — SIGNIFICANT CHANGE UP (ref 3.8–10.5)

## 2018-06-18 PROCEDURE — 99233 SBSQ HOSP IP/OBS HIGH 50: CPT

## 2018-06-18 RX ORDER — SODIUM CHLORIDE 9 MG/ML
500 INJECTION INTRAMUSCULAR; INTRAVENOUS; SUBCUTANEOUS ONCE
Qty: 0 | Refills: 0 | Status: COMPLETED | OUTPATIENT
Start: 2018-06-18 | End: 2018-06-18

## 2018-06-18 RX ORDER — POLYETHYLENE GLYCOL 3350 17 G/17G
17 POWDER, FOR SOLUTION ORAL DAILY
Qty: 0 | Refills: 0 | Status: DISCONTINUED | OUTPATIENT
Start: 2018-06-18 | End: 2018-06-20

## 2018-06-18 RX ORDER — SUMATRIPTAN SUCCINATE 4 MG/.5ML
25 INJECTION, SOLUTION SUBCUTANEOUS ONCE
Qty: 0 | Refills: 0 | Status: DISCONTINUED | OUTPATIENT
Start: 2018-06-18 | End: 2018-06-20

## 2018-06-18 RX ADMIN — Medication 650 MILLIGRAM(S): at 20:39

## 2018-06-18 RX ADMIN — Medication 5 MILLIGRAM(S): at 18:22

## 2018-06-18 RX ADMIN — OXYCODONE HYDROCHLORIDE 5 MILLIGRAM(S): 5 TABLET ORAL at 21:09

## 2018-06-18 RX ADMIN — Medication 500 MILLIGRAM(S): at 18:21

## 2018-06-18 RX ADMIN — OXYCODONE HYDROCHLORIDE 10 MILLIGRAM(S): 5 TABLET ORAL at 13:10

## 2018-06-18 RX ADMIN — Medication 100 MILLIGRAM(S): at 15:55

## 2018-06-18 RX ADMIN — Medication 500 MILLIGRAM(S): at 06:01

## 2018-06-18 RX ADMIN — SENNA PLUS 2 TABLET(S): 8.6 TABLET ORAL at 20:42

## 2018-06-18 RX ADMIN — OXYCODONE HYDROCHLORIDE 5 MILLIGRAM(S): 5 TABLET ORAL at 20:39

## 2018-06-18 RX ADMIN — OXYCODONE HYDROCHLORIDE 5 MILLIGRAM(S): 5 TABLET ORAL at 15:52

## 2018-06-18 RX ADMIN — OXYCODONE HYDROCHLORIDE 5 MILLIGRAM(S): 5 TABLET ORAL at 16:30

## 2018-06-18 RX ADMIN — ENOXAPARIN SODIUM 40 MILLIGRAM(S): 100 INJECTION SUBCUTANEOUS at 06:01

## 2018-06-18 RX ADMIN — OXYCODONE HYDROCHLORIDE 10 MILLIGRAM(S): 5 TABLET ORAL at 04:00

## 2018-06-18 RX ADMIN — Medication 5 MILLIGRAM(S): at 08:29

## 2018-06-18 RX ADMIN — Medication 650 MILLIGRAM(S): at 13:54

## 2018-06-18 RX ADMIN — Medication 100 MILLIGRAM(S): at 20:42

## 2018-06-18 RX ADMIN — OXYCODONE HYDROCHLORIDE 10 MILLIGRAM(S): 5 TABLET ORAL at 12:02

## 2018-06-18 RX ADMIN — OXYCODONE HYDROCHLORIDE 10 MILLIGRAM(S): 5 TABLET ORAL at 03:21

## 2018-06-18 RX ADMIN — Medication 650 MILLIGRAM(S): at 06:01

## 2018-06-18 RX ADMIN — Medication 1 TABLET(S): at 11:50

## 2018-06-18 RX ADMIN — Medication 100 MILLIGRAM(S): at 06:01

## 2018-06-18 RX ADMIN — DULOXETINE HYDROCHLORIDE 60 MILLIGRAM(S): 30 CAPSULE, DELAYED RELEASE ORAL at 11:50

## 2018-06-18 RX ADMIN — SODIUM CHLORIDE 2000 MILLILITER(S): 9 INJECTION INTRAMUSCULAR; INTRAVENOUS; SUBCUTANEOUS at 06:01

## 2018-06-18 NOTE — PROGRESS NOTE ADULT - PROBLEM SELECTOR PLAN 1
s/p revision fusion   1 Out of bed   2 walk   3 dvt ppx sql scds   4 prn pain meds   5 valium prn for muscle spasm   6 incentive spirometry   7 fever of unknown orgin- work up negative so far- observe off abx   8 appreciate ID and hospitalist input   9 continue cymbalta   10 regular diet   11 stool softeners   12 add miralax   13 dispo: home with home PT once stable

## 2018-06-18 NOTE — PROVIDER CONTACT NOTE (OTHER) - ASSESSMENT
pt states she has had these in the past and has been worked up for this,
Pt AOx4. Last blood cultures sent 6/14. Denies pain.
Pt is AXOx4, VS stable. No complaints of pain or respiratory distress.
Temp 100.5, s/p Tylenol 650mg PO x1 last night, BP 96/59, HR 77, RR 16, O2 95% RA
no further changes noted.  pt. remains neurologically intact
no s/s of hypocalemia noted

## 2018-06-18 NOTE — PROGRESS NOTE ADULT - ASSESSMENT
59 yo with redo lumbar laminectomy with hardware  No signs of infection preop  1500 cc blood loss  I am still concerned that post op fever is inflammatory and not infection driven.  Her fever should moderate soon  If fevers continue would repeat blood cultures in 24-48 hours  No signs of post op wound infection.  Klebsiella in urine with a benign UA and no symptoms is colonizing mirian.  Inflammatory markers expected to be elevated in the post op setting  Will follow clinically

## 2018-06-18 NOTE — PROVIDER CONTACT NOTE (OTHER) - ACTION/TREATMENT ORDERED:
states he will order meds for her and come see patient
As per provider, no interventions at this time
Calcium gluconate 1g IVSS
Give Tylenol 650mg PO and NS 500ml bolus. No need for blood cultures; last culture collected on 6/14/18
Tylenol 650 mg Oral
draw repeat lab work at 0600,  given NS bolus 500,  continue to monitor

## 2018-06-18 NOTE — PROGRESS NOTE ADULT - PROBLEM SELECTOR PROBLEM 9
Aortic valve insufficiency, etiology of cardiac valve disease unspecified Migraine aura without headache

## 2018-06-18 NOTE — PROGRESS NOTE ADULT - SUBJECTIVE AND OBJECTIVE BOX
CC: f/u for post op fever    Patient reports headaches with fever, "ocular", helped by imitrex.No photophobia or n/v.She has stable lower back pain, has been ambulatory.No shortness of breath or chest pain.No GI or  complaints.    REVIEW OF SYSTEMS:  All other review of systems negative (Comprehensive ROS)    Antimicrobials Day #  :off    Other Medications Reviewed    T(F): 100.4 (18 @ 13:29), Max: 102.2 (18 @ 15:52)  HR: 77 (18 @ 11:57)  BP: 99/56 (18 @ 11:57)  RR: 18 (18 @ 11:57)  SpO2: 96% (18 @ 11:57)  Wt(kg): --    PHYSICAL EXAM:  General: alert, no acute distress  Eyes:  anicteric, no conjunctival injection, no discharge  Oropharynx: no lesions or injection 	  Neck: supple, without adenopathy  Lungs: clear to auscultation  Heart: regular rate and rhythm; no murmur, rubs or gallops  Abdomen: soft, nondistended, nontender, without mass or organomegaly  Skin: no lesions  Extremities: no clubbing, cyanosis, or edema  Neurologic: alert, oriented, moves all extremities  spine incision is clean,dry and intact  LAB RESULTS:                        9.1    6.12  )-----------( 206      ( 2018 08:02 )             26.5     -    138  |  98  |  10  ----------------------------<  100<H>  4.3   |  26  |  0.78    Ca    8.5      2018 06:28  Phos  4.5         TPro  x   /  Alb  2.5<L>  /  TBili  x   /  DBili  x   /  AST  x   /  ALT  x   /  AlkPhos  x       LIVER FUNCTIONS - ( 2018 06:55 )  Alb: 2.5 g/dL / Pro: x     / ALK PHOS: x     / ALT: x     / AST: x     / GGT: x           Urinalysis Basic - ( 2018 22:55 )    Color: PL Yellow / Appearance: Clear / S.006 / pH: x  Gluc: x / Ketone: Negative  / Bili: Negative / Urobili: Negative   Blood: x / Protein: Negative / Nitrite: Negative   Leuk Esterase: Negative / RBC: x / WBC 0-2 /HPF   Sq Epi: x / Non Sq Epi: x / Bacteria: x      MICROBIOLOGY:  RECENT CULTURES:   @ 09:57 .Urine Clean Catch (Midstream)     10,000 - 49,000 CFU/mL Klebsiella pneumoniae       @ 05:33 .Blood Blood-Peripheral     No growth to date.       @ 01:59 .Blood Blood-Venous     No growth to date.          RADIOLOGY REVIEWED:  < from: CT Chest w/ IV Cont (18 @ 12:39) >  CT of A/P  IMPRESSION:   Postoperative changes. Small right pleural effusion and subsegmental   bibasilar atelectasis.

## 2018-06-18 NOTE — PROGRESS NOTE ADULT - SUBJECTIVE AND OBJECTIVE BOX
Authored by Dr Michael Pope [phone]     Patient is a 58y old  Female who presents with a chief complaint of "I need surgery for my lower back". (2018 07:16)    SUBJECTIVE / OVERNIGHT EVENTS: persistently febrile    MEDICATIONS  (STANDING):  ascorbic acid 500 milliGRAM(s) Oral two times a day  docusate sodium 100 milliGRAM(s) Oral three times a day  DULoxetine 60 milliGRAM(s) Oral daily  enoxaparin Injectable 40 milliGRAM(s) SubCutaneous every 24 hours  multivitamin 1 Tablet(s) Oral daily  senna 2 Tablet(s) Oral at bedtime  sodium chloride 0.9% with potassium chloride 20 mEq/L 1000 milliLiter(s) (75 mL/Hr) IV Continuous <Continuous>    MEDICATIONS  (PRN):  acetaminophen   Tablet 650 milliGRAM(s) Oral every 6 hours PRN For Temp greater than 38 C (100.4 F)  acetaminophen   Tablet. 650 milliGRAM(s) Oral every 6 hours PRN Mild Pain (1 - 3)  diazepam    Tablet 5 milliGRAM(s) Oral every 8 hours PRN muscle spasm  diphenhydrAMINE   Capsule 25 milliGRAM(s) Oral every 4 hours PRN Pruritus  magnesium hydroxide Suspension 30 milliLiter(s) Oral every 12 hours PRN Constipation  naloxone Injectable 0.1 milliGRAM(s) IV Push every 3 minutes PRN For ANY of the following changes in patient status:  A. RR LESS THAN 10 breaths per minute, B. Oxygen saturation LESS THAN 90%, C. Sedation score of 6  ondansetron Injectable 4 milliGRAM(s) IV Push every 6 hours PRN Nausea  oxyCODONE    IR 5 milliGRAM(s) Oral every 4 hours PRN Moderate Pain (4 - 6)  oxyCODONE    IR 10 milliGRAM(s) Oral every 4 hours PRN Severe Pain (7 - 10)      Vital Signs Last 24 Hrs  T(C): 38.1 (2018 04:05), Max: 39 (2018 15:52)  T(F): 100.5 (2018 04:05), Max: 102.2 (2018 15:52)  HR: 77 (2018 04:05) (73 - 99)  BP: 96/59 (2018 04:05) (96/59 - 116/72)  BP(mean): --  RR: 16 (2018 04:05) (16 - 18)  SpO2: 95% (2018 04:05) (92% - 97%)  CAPILLARY BLOOD GLUCOSE        I&O's Summary    2018 07:01  -  2018 07:00  --------------------------------------------------------  IN: 1220 mL / OUT: 0 mL / NET: 1220 mL        PHYSICAL EXAM  GENERAL: NAD, calm  HEAD:  Atraumatic, Normocephalic  EYES: EOMI, conjunctiva and sclera clear  ENMT: Moist mucous membranes  NECK: Supple, No JVD  RESPIRATORY: Clear to auscultation bilaterally; No rales, rhonchi, wheezing, or rubs  CARDIOVASCULAR: Regular rate and rhythm; S1, S2, No murmurs, rubs, or gallops  GASTROINTESTINAL: Soft, Nontender, Nondistended; Bowel sounds present  EXTREMITIES:  No edema  NEURO:  Alert & Oriented X3; Moving all 4 extremities; No gross sensory deficits  SKIN: lumbar staples in place post-op - area appears clean, dry, intact. No evidence of cellulitis.     LABS:                        9.1    6.12  )-----------( 206      ( 2018 08:02 )             26.5     -    138  |  98  |  10  ----------------------------<  100<H>  4.3   |  26  |  0.78    Ca    8.5      2018 06:28  Phos  4.5         TPro  x   /  Alb  2.5<L>  /  TBili  x   /  DBili  x   /  AST  x   /  ALT  x   /  AlkPhos  x             Urinalysis Basic - ( 2018 22:55 )    Color: PL Yellow / Appearance: Clear / S.006 / pH: x  Gluc: x / Ketone: Negative  / Bili: Negative / Urobili: Negative   Blood: x / Protein: Negative / Nitrite: Negative   Leuk Esterase: Negative / RBC: x / WBC 0-2 /HPF   Sq Epi: x / Non Sq Epi: x / Bacteria: x        Culture - Urine (collected 2018 09:57)  Source: .Urine Clean Catch (Midstream)  Preliminary Report (2018 10:47):    10,000 - 49,000 CFU/mL Klebsiella pneumoniae    Culture - Blood (collected 2018 05:33)  Source: .Blood Blood-Peripheral  Preliminary Report (2018 06:01):    No growth to date.    Culture - Blood (collected 2018 05:33)  Source: .Blood Blood-Peripheral  Preliminary Report (2018 06:01):    No growth to date.        RADIOLOGY & ADDITIONAL TESTS:    Imaging Personally Reviewed: < from: CT Chest w/ IV Cont (18 @ 12:39) >  IMPRESSION:   Postoperative changes. Small right pleural effusion and subsegmental   bibasilar atelectasis.    Etiology of patient's fever is not identified on the current examination.     < end of copied text >    Consultant(s) Notes Reviewed:  ID  Care Discussed with Consultants/Other Providers: Authored by Dr Michael Pope [phone]     Patient is a 58y old  Female who presents with a chief complaint of "I need surgery for my lower back". (2018 07:16)    SUBJECTIVE / OVERNIGHT EVENTS: persistently febrile. has "ocular headache" - migraine type symptoms w/prism-like aura. improved w/imitrex.     MEDICATIONS  (STANDING):  ascorbic acid 500 milliGRAM(s) Oral two times a day  docusate sodium 100 milliGRAM(s) Oral three times a day  DULoxetine 60 milliGRAM(s) Oral daily  enoxaparin Injectable 40 milliGRAM(s) SubCutaneous every 24 hours  multivitamin 1 Tablet(s) Oral daily  senna 2 Tablet(s) Oral at bedtime  sodium chloride 0.9% with potassium chloride 20 mEq/L 1000 milliLiter(s) (75 mL/Hr) IV Continuous <Continuous>    MEDICATIONS  (PRN):  acetaminophen   Tablet 650 milliGRAM(s) Oral every 6 hours PRN For Temp greater than 38 C (100.4 F)  acetaminophen   Tablet. 650 milliGRAM(s) Oral every 6 hours PRN Mild Pain (1 - 3)  diazepam    Tablet 5 milliGRAM(s) Oral every 8 hours PRN muscle spasm  diphenhydrAMINE   Capsule 25 milliGRAM(s) Oral every 4 hours PRN Pruritus  magnesium hydroxide Suspension 30 milliLiter(s) Oral every 12 hours PRN Constipation  naloxone Injectable 0.1 milliGRAM(s) IV Push every 3 minutes PRN For ANY of the following changes in patient status:  A. RR LESS THAN 10 breaths per minute, B. Oxygen saturation LESS THAN 90%, C. Sedation score of 6  ondansetron Injectable 4 milliGRAM(s) IV Push every 6 hours PRN Nausea  oxyCODONE    IR 5 milliGRAM(s) Oral every 4 hours PRN Moderate Pain (4 - 6)  oxyCODONE    IR 10 milliGRAM(s) Oral every 4 hours PRN Severe Pain (7 - 10)      Vital Signs Last 24 Hrs  T(C): 38.1 (2018 04:05), Max: 39 (2018 15:52)  T(F): 100.5 (2018 04:05), Max: 102.2 (2018 15:52)  HR: 77 (2018 04:05) (73 - 99)  BP: 96/59 (2018 04:05) (96/59 - 116/72)  BP(mean): --  RR: 16 (2018 04:05) (16 - 18)  SpO2: 95% (2018 04:05) (92% - 97%)  CAPILLARY BLOOD GLUCOSE        I&O's Summary    2018 07:01  -  2018 07:00  --------------------------------------------------------  IN: 1220 mL / OUT: 0 mL / NET: 1220 mL        PHYSICAL EXAM  GENERAL: NAD, calm  HEAD:  Atraumatic, Normocephalic  EYES: EOMI, conjunctiva and sclera clear  ENMT: Moist mucous membranes  NECK: Supple, No JVD  RESPIRATORY: Clear to auscultation bilaterally; No rales, rhonchi, wheezing, or rubs  CARDIOVASCULAR: Regular rate and rhythm; S1, S2, No murmurs, rubs, or gallops  GASTROINTESTINAL: Soft, Nontender, Nondistended; Bowel sounds present  EXTREMITIES:  No edema  NEURO:  Alert & Oriented X3; Moving all 4 extremities; No gross sensory deficits  PSYCH: increasingly anxious  SKIN: lumbar staples in place post-op - area appears clean, dry, intact. No evidence of cellulitis.     LABS:                        9.1    6.12  )-----------( 206      ( 2018 08:02 )             26.5     06-18    138  |  98  |  10  ----------------------------<  100<H>  4.3   |  26  |  0.78    Ca    8.5      2018 06:28  Phos  4.5     06-18    TPro  x   /  Alb  2.5<L>  /  TBili  x   /  DBili  x   /  AST  x   /  ALT  x   /  AlkPhos  x   -          Urinalysis Basic - ( 2018 22:55 )    Color: PL Yellow / Appearance: Clear / S.006 / pH: x  Gluc: x / Ketone: Negative  / Bili: Negative / Urobili: Negative   Blood: x / Protein: Negative / Nitrite: Negative   Leuk Esterase: Negative / RBC: x / WBC 0-2 /HPF   Sq Epi: x / Non Sq Epi: x / Bacteria: x        Culture - Urine (collected 2018 09:57)  Source: .Urine Clean Catch (Midstream)  Preliminary Report (2018 10:47):    10,000 - 49,000 CFU/mL Klebsiella pneumoniae    Culture - Blood (collected 2018 05:33)  Source: .Blood Blood-Peripheral  Preliminary Report (2018 06:01):    No growth to date.    Culture - Blood (collected 2018 05:33)  Source: .Blood Blood-Peripheral  Preliminary Report (2018 06:01):    No growth to date.        RADIOLOGY & ADDITIONAL TESTS:    Imaging Personally Reviewed: < from: CT Chest w/ IV Cont (18 @ 12:39) >  IMPRESSION:   Postoperative changes. Small right pleural effusion and subsegmental   bibasilar atelectasis.    Etiology of patient's fever is not identified on the current examination.     < end of copied text >    Consultant(s) Notes Reviewed:  ID  Care Discussed with Consultants/Other Providers:

## 2018-06-18 NOTE — PROGRESS NOTE ADULT - PROBLEM SELECTOR PLAN 9
no evidence of heart failure at this time   continue to monitor  outpatient follow up sumatriptan prn per nsgy

## 2018-06-18 NOTE — PROGRESS NOTE ADULT - PROBLEM SELECTOR PLAN 8
outpatient f/u no evidence of heart failure at this time   continue to monitor  outpatient follow up

## 2018-06-18 NOTE — PROGRESS NOTE ADULT - ASSESSMENT
HPI:  57 year old female with history of  Anxiety, chronic low back pain s/p  L3-L4 lumbar laminectomy and fusion  in 2011. Pt has been c/o progressive  low back pain that radiates down the right leg with associated numbness and tingling for 3 moths followed by neurology tried pain medication without any relief s/p MRI revealed Junctional stenosis ,Grade 1 spondylolisthesis ,right synovial cyst . Presents to PST for scheduled Removal of Lumbar hardware  L2-5 decompression  L2- 3  L4 -5 posterior Lumbar Fusion  L2 - 5 Fusion on 6/12/2018. (05 Jun 2018 11:03)        s/p l4-s1 PLIF extension of fusion to L2 and resection of synovial cyst- 6/12

## 2018-06-18 NOTE — PROGRESS NOTE ADULT - PROBLEM SELECTOR PLAN 1
persistently febrile, has a very high ESR and CRP, but nontoxic appearing  CXR and UA normal. Blood cultures from 5/14 negative. PCT equivocal. CT a/p/chest unremarkable.   The surgical site does not look infected.   LE doppler negative for DVT.  Continue to monitor off antibiotics.   Incentive spirometry  ID recs appreciated - possibly from blood persistently febrile, has a very high ESR and CRP, but nontoxic appearing  CXR and UA normal. Blood cultures from 5/14 negative. PCT equivocal. CT a/p/chest unremarkable.   The surgical site does not look infected.   LE doppler negative for DVT.  Continue to monitor off antibiotics.   Incentive spirometry  ID recs appreciated - possibly from blood in site - f/u ID

## 2018-06-18 NOTE — PROGRESS NOTE ADULT - SUBJECTIVE AND OBJECTIVE BOX
SUBJECTIVE: Patient was seen and evaluated at bedside. Patient is resting comfortably in bed and is in no new acute distress.     OVERNIGHT EVENTS: none     Vital Signs Last 24 Hrs  T(C): 37.2 (18 Jun 2018 11:57), Max: 39 (17 Jun 2018 15:52)  T(F): 98.9 (18 Jun 2018 11:57), Max: 102.2 (17 Jun 2018 15:52)  HR: 77 (18 Jun 2018 11:57) (73 - 99)  BP: 99/56 (18 Jun 2018 11:57) (94/56 - 116/72)  BP(mean): --  RR: 18 (18 Jun 2018 11:57) (16 - 18)  SpO2: 96% (18 Jun 2018 11:57) (92% - 96%)    PHYSICAL EXAM:    General: No Acute Distress     Neurological: Awake, alert oriented to person, place and time, Following Commands, PERRL, EOMI, Face Symmetrical, Speech Fluent, Moving all extremities, Muscle Strength normal in all four extremities, No Drift, Sensation to Light Touch Intact    Pulmonary: Clear to Auscultation, No Rales, No Rhonchi, No Wheezes     Cardiovascular: S1, S2, Regular Rate and Rhythm     Gastrointestinal: Soft, Nontender, Nondistended     Incision: clean and dry     LABS:                        9.1    6.12  )-----------( 206      ( 18 Jun 2018 08:02 )             26.5    06-18    138  |  98  |  10  ----------------------------<  100<H>  4.3   |  26  |  0.78    Ca    8.5      18 Jun 2018 06:28  Phos  4.5     06-18    TPro  x   /  Alb  2.5<L>  /  TBili  x   /  DBili  x   /  AST  x   /  ALT  x   /  AlkPhos  x   06-17 06-17 @ 07:01  -  06-18 @ 07:00  --------------------------------------------------------  IN: 1220 mL / OUT: 0 mL / NET: 1220 mL      DRAINS:     MEDICATIONS:  Antibiotics:    Neuro:  acetaminophen   Tablet 650 milliGRAM(s) Oral every 6 hours PRN For Temp greater than 38 C (100.4 F)  acetaminophen   Tablet. 650 milliGRAM(s) Oral every 6 hours PRN Mild Pain (1 - 3)  diazepam    Tablet 5 milliGRAM(s) Oral every 8 hours PRN muscle spasm  diphenhydrAMINE   Capsule 25 milliGRAM(s) Oral every 4 hours PRN Pruritus  DULoxetine 60 milliGRAM(s) Oral daily  ondansetron Injectable 4 milliGRAM(s) IV Push every 6 hours PRN Nausea  oxyCODONE    IR 5 milliGRAM(s) Oral every 4 hours PRN Moderate Pain (4 - 6)  oxyCODONE    IR 10 milliGRAM(s) Oral every 4 hours PRN Severe Pain (7 - 10)    Cardiac:    Pulm:    GI/:  docusate sodium 100 milliGRAM(s) Oral three times a day  magnesium hydroxide Suspension 30 milliLiter(s) Oral every 12 hours PRN Constipation  polyethylene glycol 3350 17 Gram(s) Oral daily  senna 2 Tablet(s) Oral at bedtime    Other:   ascorbic acid 500 milliGRAM(s) Oral two times a day  enoxaparin Injectable 40 milliGRAM(s) SubCutaneous every 24 hours  multivitamin 1 Tablet(s) Oral daily  naloxone Injectable 0.1 milliGRAM(s) IV Push every 3 minutes PRN For ANY of the following changes in patient status:  A. RR LESS THAN 10 breaths per minute, B. Oxygen saturation LESS THAN 90%, C. Sedation score of 6    DIET: [] Regular [] CCD [] Renal [] Puree [] Dysphagia [] Tube Feeds: regular     IMAGING: no new imaging today

## 2018-06-19 DIAGNOSIS — R82.71 BACTERIURIA: ICD-10-CM

## 2018-06-19 DIAGNOSIS — B00.1 HERPESVIRAL VESICULAR DERMATITIS: ICD-10-CM

## 2018-06-19 DIAGNOSIS — I95.9 HYPOTENSION, UNSPECIFIED: ICD-10-CM

## 2018-06-19 LAB
-  AMIKACIN: SIGNIFICANT CHANGE UP
-  AMOXICILLIN/CLAVULANIC ACID: SIGNIFICANT CHANGE UP
-  AMPICILLIN/SULBACTAM: SIGNIFICANT CHANGE UP
-  AMPICILLIN: SIGNIFICANT CHANGE UP
-  AZTREONAM: SIGNIFICANT CHANGE UP
-  CEFAZOLIN: SIGNIFICANT CHANGE UP
-  CEFEPIME: SIGNIFICANT CHANGE UP
-  CEFOXITIN: SIGNIFICANT CHANGE UP
-  CEFTRIAXONE: SIGNIFICANT CHANGE UP
-  CIPROFLOXACIN: SIGNIFICANT CHANGE UP
-  ERTAPENEM: SIGNIFICANT CHANGE UP
-  GENTAMICIN: SIGNIFICANT CHANGE UP
-  IMIPENEM: SIGNIFICANT CHANGE UP
-  LEVOFLOXACIN: SIGNIFICANT CHANGE UP
-  MEROPENEM: SIGNIFICANT CHANGE UP
-  NITROFURANTOIN: SIGNIFICANT CHANGE UP
-  PIPERACILLIN/TAZOBACTAM: SIGNIFICANT CHANGE UP
-  TIGECYCLINE: SIGNIFICANT CHANGE UP
-  TOBRAMYCIN: SIGNIFICANT CHANGE UP
-  TRIMETHOPRIM/SULFAMETHOXAZOLE: SIGNIFICANT CHANGE UP
ANION GAP SERPL CALC-SCNC: 14 MMOL/L — SIGNIFICANT CHANGE UP (ref 5–17)
BUN SERPL-MCNC: 9 MG/DL — SIGNIFICANT CHANGE UP (ref 7–23)
CALCIUM SERPL-MCNC: 8.2 MG/DL — LOW (ref 8.4–10.5)
CHLORIDE SERPL-SCNC: 95 MMOL/L — LOW (ref 96–108)
CO2 SERPL-SCNC: 25 MMOL/L — SIGNIFICANT CHANGE UP (ref 22–31)
CREAT SERPL-MCNC: 0.74 MG/DL — SIGNIFICANT CHANGE UP (ref 0.5–1.3)
CULTURE RESULTS: SIGNIFICANT CHANGE UP
GLUCOSE SERPL-MCNC: 100 MG/DL — HIGH (ref 70–99)
HCT VFR BLD CALC: 25 % — LOW (ref 34.5–45)
HGB BLD-MCNC: 8.8 G/DL — LOW (ref 11.5–15.5)
MCHC RBC-ENTMCNC: 33.1 PG — SIGNIFICANT CHANGE UP (ref 27–34)
MCHC RBC-ENTMCNC: 35.2 GM/DL — SIGNIFICANT CHANGE UP (ref 32–36)
MCV RBC AUTO: 93.8 FL — SIGNIFICANT CHANGE UP (ref 80–100)
METHOD TYPE: SIGNIFICANT CHANGE UP
ORGANISM # SPEC MICROSCOPIC CNT: SIGNIFICANT CHANGE UP
ORGANISM # SPEC MICROSCOPIC CNT: SIGNIFICANT CHANGE UP
PLATELET # BLD AUTO: 297 K/UL — SIGNIFICANT CHANGE UP (ref 150–400)
POTASSIUM SERPL-MCNC: 4.2 MMOL/L — SIGNIFICANT CHANGE UP (ref 3.5–5.3)
POTASSIUM SERPL-SCNC: 4.2 MMOL/L — SIGNIFICANT CHANGE UP (ref 3.5–5.3)
RBC # BLD: 2.67 M/UL — LOW (ref 3.8–5.2)
RBC # FLD: 12 % — SIGNIFICANT CHANGE UP (ref 10.3–14.5)
SODIUM SERPL-SCNC: 134 MMOL/L — LOW (ref 135–145)
SPECIMEN SOURCE: SIGNIFICANT CHANGE UP
SRA INTERP SER-IMP: SIGNIFICANT CHANGE UP
WBC # BLD: 6 K/UL — SIGNIFICANT CHANGE UP (ref 3.8–10.5)
WBC # FLD AUTO: 6 K/UL — SIGNIFICANT CHANGE UP (ref 3.8–10.5)

## 2018-06-19 PROCEDURE — 99233 SBSQ HOSP IP/OBS HIGH 50: CPT

## 2018-06-19 RX ORDER — DIAZEPAM 5 MG
5 TABLET ORAL EVERY 6 HOURS
Qty: 0 | Refills: 0 | Status: DISCONTINUED | OUTPATIENT
Start: 2018-06-19 | End: 2018-06-20

## 2018-06-19 RX ORDER — SODIUM CHLORIDE 9 MG/ML
1 INJECTION INTRAMUSCULAR; INTRAVENOUS; SUBCUTANEOUS THREE TIMES A DAY
Qty: 0 | Refills: 0 | Status: COMPLETED | OUTPATIENT
Start: 2018-06-19 | End: 2018-06-20

## 2018-06-19 RX ORDER — VALACYCLOVIR 500 MG/1
2000 TABLET, FILM COATED ORAL
Qty: 0 | Refills: 0 | Status: COMPLETED | OUTPATIENT
Start: 2018-06-19 | End: 2018-06-20

## 2018-06-19 RX ADMIN — Medication 100 MILLIGRAM(S): at 22:16

## 2018-06-19 RX ADMIN — SODIUM CHLORIDE 1 GRAM(S): 9 INJECTION INTRAMUSCULAR; INTRAVENOUS; SUBCUTANEOUS at 22:16

## 2018-06-19 RX ADMIN — DULOXETINE HYDROCHLORIDE 60 MILLIGRAM(S): 30 CAPSULE, DELAYED RELEASE ORAL at 11:21

## 2018-06-19 RX ADMIN — OXYCODONE HYDROCHLORIDE 10 MILLIGRAM(S): 5 TABLET ORAL at 20:51

## 2018-06-19 RX ADMIN — POLYETHYLENE GLYCOL 3350 17 GRAM(S): 17 POWDER, FOR SOLUTION ORAL at 11:21

## 2018-06-19 RX ADMIN — Medication 500 MILLIGRAM(S): at 04:22

## 2018-06-19 RX ADMIN — ENOXAPARIN SODIUM 40 MILLIGRAM(S): 100 INJECTION SUBCUTANEOUS at 06:10

## 2018-06-19 RX ADMIN — Medication 5 MILLIGRAM(S): at 08:58

## 2018-06-19 RX ADMIN — OXYCODONE HYDROCHLORIDE 10 MILLIGRAM(S): 5 TABLET ORAL at 04:22

## 2018-06-19 RX ADMIN — VALACYCLOVIR 2000 MILLIGRAM(S): 500 TABLET, FILM COATED ORAL at 18:02

## 2018-06-19 RX ADMIN — Medication 100 MILLIGRAM(S): at 16:57

## 2018-06-19 RX ADMIN — Medication 5 MILLIGRAM(S): at 02:06

## 2018-06-19 RX ADMIN — OXYCODONE HYDROCHLORIDE 10 MILLIGRAM(S): 5 TABLET ORAL at 11:20

## 2018-06-19 RX ADMIN — OXYCODONE HYDROCHLORIDE 10 MILLIGRAM(S): 5 TABLET ORAL at 20:21

## 2018-06-19 RX ADMIN — Medication 5 MILLIGRAM(S): at 22:16

## 2018-06-19 RX ADMIN — Medication 100 MILLIGRAM(S): at 04:25

## 2018-06-19 RX ADMIN — Medication 500 MILLIGRAM(S): at 18:02

## 2018-06-19 RX ADMIN — Medication 1 TABLET(S): at 11:20

## 2018-06-19 RX ADMIN — Medication 5 MILLIGRAM(S): at 15:22

## 2018-06-19 RX ADMIN — OXYCODONE HYDROCHLORIDE 10 MILLIGRAM(S): 5 TABLET ORAL at 05:00

## 2018-06-19 RX ADMIN — SENNA PLUS 2 TABLET(S): 8.6 TABLET ORAL at 22:16

## 2018-06-19 RX ADMIN — SODIUM CHLORIDE 1 GRAM(S): 9 INJECTION INTRAMUSCULAR; INTRAVENOUS; SUBCUTANEOUS at 16:57

## 2018-06-19 NOTE — PROGRESS NOTE ADULT - SUBJECTIVE AND OBJECTIVE BOX
SUBJECTIVE:  Patient was seen and evaluated at bedside. Patient is resting comfortably in bed and is in no new acute distress.     OVERNIGHT EVENTS: none     Vital Signs Last 24 Hrs  T(C): 37.1 (19 Jun 2018 07:47), Max: 38.1 (18 Jun 2018 20:28)  T(F): 98.8 (19 Jun 2018 07:47), Max: 100.6 (19 Jun 2018 04:00)  HR: 77 (19 Jun 2018 07:47) (70 - 85)  BP: 98/71 (19 Jun 2018 07:47) (88/52 - 117/75)  BP(mean): --  RR: 18 (19 Jun 2018 07:47) (16 - 18)  SpO2: 96% (19 Jun 2018 07:47) (95% - 99%)    PHYSICAL EXAM:    General: No Acute Distress     Neurological: Awake, alert oriented to person, place and time, Following Commands, PERRL, EOMI, Face Symmetrical, Speech Fluent, Moving all extremities, Muscle Strength normal in all four extremities, No Drift, Sensation to Light Touch Intact    Pulmonary: Clear to Auscultation, No Rales, No Rhonchi, No Wheezes     Cardiovascular: S1, S2, Regular Rate and Rhythm     Gastrointestinal: Soft, Nontender, Nondistended     Incision: clean and dry     LABS:                        8.8    6.0   )-----------( 297      ( 19 Jun 2018 09:31 )             25.0    06-19    134<L>  |  95<L>  |  9   ----------------------------<  100<H>  4.2   |  25  |  0.74    Ca    8.2<L>      19 Jun 2018 09:21  Phos  4.5     06-18          06-18 @ 07:01  -  06-19 @ 07:00  --------------------------------------------------------  IN: 1620 mL / OUT: 0 mL / NET: 1620 mL      DRAINS:     MEDICATIONS:  Antibiotics:    Neuro:  acetaminophen   Tablet 650 milliGRAM(s) Oral every 6 hours PRN For Temp greater than 38 C (100.4 F)  acetaminophen   Tablet. 650 milliGRAM(s) Oral every 6 hours PRN Mild Pain (1 - 3)  diazepam    Tablet 5 milliGRAM(s) Oral every 6 hours PRN muscle spasm  diphenhydrAMINE   Capsule 25 milliGRAM(s) Oral every 4 hours PRN Pruritus  DULoxetine 60 milliGRAM(s) Oral daily  ondansetron Injectable 4 milliGRAM(s) IV Push every 6 hours PRN Nausea  oxyCODONE    IR 5 milliGRAM(s) Oral every 4 hours PRN Moderate Pain (4 - 6)  oxyCODONE    IR 10 milliGRAM(s) Oral every 4 hours PRN Severe Pain (7 - 10)  SUMAtriptan 25 milliGRAM(s) Oral once    Cardiac:    Pulm:    GI/:  docusate sodium 100 milliGRAM(s) Oral three times a day  magnesium hydroxide Suspension 30 milliLiter(s) Oral every 12 hours PRN Constipation  polyethylene glycol 3350 17 Gram(s) Oral daily  senna 2 Tablet(s) Oral at bedtime    Other:   ascorbic acid 500 milliGRAM(s) Oral two times a day  enoxaparin Injectable 40 milliGRAM(s) SubCutaneous every 24 hours  multivitamin 1 Tablet(s) Oral daily  naloxone Injectable 0.1 milliGRAM(s) IV Push every 3 minutes PRN For ANY of the following changes in patient status:  A. RR LESS THAN 10 breaths per minute, B. Oxygen saturation LESS THAN 90%, C. Sedation score of 6  sodium chloride 1 Gram(s) Oral three times a day    DIET: [] Regular [] CCD [] Renal [] Puree [] Dysphagia [] Tube Feeds:  regular     IMAGING: no new imaging today

## 2018-06-19 NOTE — PROGRESS NOTE ADULT - PROBLEM SELECTOR PLAN 1
s/p revision fusion   1 Out of bed   2 walk   3 dvt ppx sql scds   4 prn pain meds   5 valium prn for muscle spasm   6 incentive spirometry   7 fever of unknown orgin- work up negative so far- observe off abx   8 appreciate ID and hospitalist input   9 continue cymbalta   10 regular diet   11 stool softeners   12 continue miralax   13 dispo: home with home PT once stable.

## 2018-06-19 NOTE — PROGRESS NOTE ADULT - PROBLEM SELECTOR PLAN 1
pt is nontoxic appearing and has no symptoms, just received benzo but I'm not sure BP is accurate given how she appears - would repeat BP and resuscitate w/IVF as needed pt is nontoxic appearing and has no symptoms, just received benzo but I'm not sure BP is accurate given how she appears - would repeat BP and resuscitate w/IVF as needed    no sign of bleeding or infection, PO intake encouraged

## 2018-06-19 NOTE — PROGRESS NOTE ADULT - SUBJECTIVE AND OBJECTIVE BOX
CC: f/u for fever    Patient reports less fever, improved headaches, no specific complaints today.    REVIEW OF SYSTEMS:  All other review of systems negative (Comprehensive ROS)    Antimicrobials Day #  :  valACYclovir 2000 milliGRAM(s) Oral two times a day    Other Medications Reviewed    T(F): 98.8 (18 @ 07:47), Max: 100.6 (18 @ 04:00)  HR: 77 (18 @ 07:47)  BP: 98/71 (18 @ 07:47)  RR: 18 (18 @ 07:47)  SpO2: 96% (18 @ 07:47)  Wt(kg): --    PHYSICAL EXAM:  General: alert, no acute distress  Eyes:  anicteric, no conjunctival injection, no discharge  Oropharynx: no lesions or injection 	  Neck: supple, without adenopathy  Lungs: clear to auscultation  Heart: regular rate and rhythm; no murmur, rubs or gallops  Abdomen: soft, nondistended, nontender, without mass or organomegaly  Skin: no lesions  Extremities: no clubbing, cyanosis, or edema  Neurologic: alert, oriented, moves all extremities  Back incision is C/D/I  LAB RESULTS:                        8.8    6.0   )-----------( 297      ( 2018 09:31 )             25.0         134<L>  |  95<L>  |  9   ----------------------------<  100<H>  4.2   |  25  |  0.74    Ca    8.2<L>      2018 09:21  Phos  4.5             Urinalysis Basic - ( 2018 22:55 )    Color: PL Yellow / Appearance: Clear / S.006 / pH: x  Gluc: x / Ketone: Negative  / Bili: Negative / Urobili: Negative   Blood: x / Protein: Negative / Nitrite: Negative   Leuk Esterase: Negative / RBC: x / WBC 0-2 /HPF   Sq Epi: x / Non Sq Epi: x / Bacteria: x      MICROBIOLOGY:  RECENT CULTURES:   @ 09:57 .Urine Clean Catch (Midstream) Klebsiella pneumoniae    10,000 - 49,000 CFU/mL Klebsiella pneumoniae       @ 05:33 .Blood Blood-Peripheral     No growth to date.          RADIOLOGY REVIEWED:

## 2018-06-19 NOTE — PROGRESS NOTE ADULT - ASSESSMENT
57 yo with redo lumbar laminectomy with hardware  No signs of infection preop  1500 cc blood loss  I am still concerned that post op fever is inflammatory and not infection driven.  Her fever should moderate soon.  Temps appear to be moderating  No signs of post op wound infection.  Klebsiella in urine with a benign UA and no symptoms is colonizing mirian.  Inflammatory markers expected to be elevated in the post op setting  Will follow clinically, valtrex started earlier for 1 day for ? HSV outbreak  No role for antibacterial agents

## 2018-06-19 NOTE — PROGRESS NOTE ADULT - SUBJECTIVE AND OBJECTIVE BOX
Authored by Dr Michael Pope [id number] / [id number]    Patient is a 58y old  Female who presents with a chief complaint of "I need surgery for my lower back". (2018 07:16)    SUBJECTIVE / OVERNIGHT EVENTS: Pt noted BP 80s/30s but asymptomatic. she had just received benzo. no CP. Developed herpes outbreak in lower lip. Still febrile.     MEDICATIONS  (STANDING):  ascorbic acid 500 milliGRAM(s) Oral two times a day  docusate sodium 100 milliGRAM(s) Oral three times a day  DULoxetine 60 milliGRAM(s) Oral daily  enoxaparin Injectable 40 milliGRAM(s) SubCutaneous every 24 hours  multivitamin 1 Tablet(s) Oral daily  polyethylene glycol 3350 17 Gram(s) Oral daily  senna 2 Tablet(s) Oral at bedtime  sodium chloride 1 Gram(s) Oral three times a day  SUMAtriptan 25 milliGRAM(s) Oral once  valACYclovir 2000 milliGRAM(s) Oral two times a day    MEDICATIONS  (PRN):  acetaminophen   Tablet 650 milliGRAM(s) Oral every 6 hours PRN For Temp greater than 38 C (100.4 F)  acetaminophen   Tablet. 650 milliGRAM(s) Oral every 6 hours PRN Mild Pain (1 - 3)  diazepam    Tablet 5 milliGRAM(s) Oral every 6 hours PRN muscle spasm  diphenhydrAMINE   Capsule 25 milliGRAM(s) Oral every 4 hours PRN Pruritus  magnesium hydroxide Suspension 30 milliLiter(s) Oral every 12 hours PRN Constipation  naloxone Injectable 0.1 milliGRAM(s) IV Push every 3 minutes PRN For ANY of the following changes in patient status:  A. RR LESS THAN 10 breaths per minute, B. Oxygen saturation LESS THAN 90%, C. Sedation score of 6  ondansetron Injectable 4 milliGRAM(s) IV Push every 6 hours PRN Nausea  oxyCODONE    IR 5 milliGRAM(s) Oral every 4 hours PRN Moderate Pain (4 - 6)  oxyCODONE    IR 10 milliGRAM(s) Oral every 4 hours PRN Severe Pain (7 - 10)      Vital Signs Last 24 Hrs  T(C): 36.8 (2018 15:30), Max: 38.1 (2018 20:28)  T(F): 98.2 (2018 15:30), Max: 100.6 (2018 04:00)  HR: 83 (2018 15:30) (77 - 85)  BP: 85/52 (2018 15:30) (85/52 - 117/75)  BP(mean): --  RR: 18 (2018 15:30) (16 - 18)  SpO2: 99% (2018 15:30) (95% - 99%)  CAPILLARY BLOOD GLUCOSE        I&O's Summary    2018 07:01  -  2018 07:00  --------------------------------------------------------  IN: 1620 mL / OUT: 0 mL / NET: 1620 mL    2018 07:01  -  2018 17:51  --------------------------------------------------------  IN: 1140 mL / OUT: 0 mL / NET: 1140 mL        PHYSICAL EXAM  GENERAL: NAD, calm  HEAD:  Atraumatic, Normocephalic  EYES: EOMI, conjunctiva and sclera clear  ENMT: Moist mucous membranes, interior lower lip ulceration  NECK: Supple, No JVD  RESPIRATORY: Clear to auscultation bilaterally; No rales, rhonchi, wheezing, or rubs  CARDIOVASCULAR: Regular rate and rhythm; S1, S2, No murmurs, rubs, or gallops  GASTROINTESTINAL: Soft, Nontender, Nondistended; Bowel sounds present  EXTREMITIES:  No edema  NEURO:  Alert & Oriented X3; Moving all 4 extremities; No gross sensory deficits  PSYCH: increasingly anxious  SKIN: lumbar staples in place post-op - area appears clean, dry, intact. No evidence of cellulitis.     LABS:                        8.8    6.0   )-----------( 297      ( 2018 09:31 )             25.0     06-19    134<L>  |  95<L>  |  9   ----------------------------<  100<H>  4.2   |  25  |  0.74    Ca    8.2<L>      2018 09:21  Phos  4.5     06-18    Urinalysis Basic - ( 2018 22:55 )    Color: PL Yellow / Appearance: Clear / S.006 / pH: x  Gluc: x / Ketone: Negative  / Bili: Negative / Urobili: Negative   Blood: x / Protein: Negative / Nitrite: Negative   Leuk Esterase: Negative / RBC: x / WBC 0-2 /HPF   Sq Epi: x / Non Sq Epi: x / Bacteria: x    Culture - Urine (collected 2018 09:57)  Source: .Urine Clean Catch (Midstream)  Final Report (2018 07:56):    10,000 - 49,000 CFU/mL Klebsiella pneumoniae  Organism: Klebsiella pneumoniae (2018 07:56)  Organism: Klebsiella pneumoniae (2018 07:56)    Culture - Blood (collected 2018 05:33)  Source: .Blood Blood-Peripheral  Preliminary Report (2018 06:01):    No growth to date.    Culture - Blood (collected 2018 05:33)  Source: .Blood Blood-Peripheral  Preliminary Report (2018 06:01):    No growth to date.        RADIOLOGY & ADDITIONAL TESTS:    Imaging Personally Reviewed:  Consultant(s) Notes Reviewed:    Care Discussed with Consultants/Other Providers:

## 2018-06-20 ENCOUNTER — TRANSCRIPTION ENCOUNTER (OUTPATIENT)
Age: 58
End: 2018-06-20

## 2018-06-20 VITALS
RESPIRATION RATE: 18 BRPM | SYSTOLIC BLOOD PRESSURE: 106 MMHG | DIASTOLIC BLOOD PRESSURE: 64 MMHG | TEMPERATURE: 99 F | OXYGEN SATURATION: 98 % | HEART RATE: 70 BPM

## 2018-06-20 LAB
ANION GAP SERPL CALC-SCNC: 14 MMOL/L — SIGNIFICANT CHANGE UP (ref 5–17)
BUN SERPL-MCNC: 10 MG/DL — SIGNIFICANT CHANGE UP (ref 7–23)
CALCIUM SERPL-MCNC: 8.3 MG/DL — LOW (ref 8.4–10.5)
CHLORIDE SERPL-SCNC: 99 MMOL/L — SIGNIFICANT CHANGE UP (ref 96–108)
CO2 SERPL-SCNC: 24 MMOL/L — SIGNIFICANT CHANGE UP (ref 22–31)
CREAT SERPL-MCNC: 0.69 MG/DL — SIGNIFICANT CHANGE UP (ref 0.5–1.3)
GLUCOSE SERPL-MCNC: 97 MG/DL — SIGNIFICANT CHANGE UP (ref 70–99)
POTASSIUM SERPL-MCNC: 4.3 MMOL/L — SIGNIFICANT CHANGE UP (ref 3.5–5.3)
POTASSIUM SERPL-SCNC: 4.3 MMOL/L — SIGNIFICANT CHANGE UP (ref 3.5–5.3)
SODIUM SERPL-SCNC: 137 MMOL/L — SIGNIFICANT CHANGE UP (ref 135–145)

## 2018-06-20 PROCEDURE — 86850 RBC ANTIBODY SCREEN: CPT

## 2018-06-20 PROCEDURE — 97530 THERAPEUTIC ACTIVITIES: CPT

## 2018-06-20 PROCEDURE — 80048 BASIC METABOLIC PNL TOTAL CA: CPT

## 2018-06-20 PROCEDURE — 85247 CLOT FACTOR VIII MULTIMETRIC: CPT

## 2018-06-20 PROCEDURE — 74177 CT ABD & PELVIS W/CONTRAST: CPT

## 2018-06-20 PROCEDURE — 93970 EXTREMITY STUDY: CPT

## 2018-06-20 PROCEDURE — 85384 FIBRINOGEN ACTIVITY: CPT

## 2018-06-20 PROCEDURE — 85027 COMPLETE CBC AUTOMATED: CPT

## 2018-06-20 PROCEDURE — 86923 COMPATIBILITY TEST ELECTRIC: CPT

## 2018-06-20 PROCEDURE — C1713: CPT

## 2018-06-20 PROCEDURE — 86900 BLOOD TYPING SEROLOGIC ABO: CPT

## 2018-06-20 PROCEDURE — 99232 SBSQ HOSP IP/OBS MODERATE 35: CPT

## 2018-06-20 PROCEDURE — 85246 CLOT FACTOR VIII VW ANTIGEN: CPT

## 2018-06-20 PROCEDURE — 82728 ASSAY OF FERRITIN: CPT

## 2018-06-20 PROCEDURE — 83550 IRON BINDING TEST: CPT

## 2018-06-20 PROCEDURE — 97116 GAIT TRAINING THERAPY: CPT

## 2018-06-20 PROCEDURE — 81001 URINALYSIS AUTO W/SCOPE: CPT

## 2018-06-20 PROCEDURE — 82040 ASSAY OF SERUM ALBUMIN: CPT

## 2018-06-20 PROCEDURE — 85732 THROMBOPLASTIN TIME PARTIAL: CPT

## 2018-06-20 PROCEDURE — 83735 ASSAY OF MAGNESIUM: CPT

## 2018-06-20 PROCEDURE — 85245 CLOT FACTOR VIII VW RISTOCTN: CPT

## 2018-06-20 PROCEDURE — 80076 HEPATIC FUNCTION PANEL: CPT

## 2018-06-20 PROCEDURE — 86140 C-REACTIVE PROTEIN: CPT

## 2018-06-20 PROCEDURE — C1769: CPT

## 2018-06-20 PROCEDURE — 85611 PROTHROMBIN TEST: CPT

## 2018-06-20 PROCEDURE — 85670 THROMBIN TIME PLASMA: CPT

## 2018-06-20 PROCEDURE — 71045 X-RAY EXAM CHEST 1 VIEW: CPT

## 2018-06-20 PROCEDURE — 85379 FIBRIN DEGRADATION QUANT: CPT

## 2018-06-20 PROCEDURE — 71260 CT THORAX DX C+: CPT

## 2018-06-20 PROCEDURE — P9016: CPT

## 2018-06-20 PROCEDURE — 81003 URINALYSIS AUTO W/O SCOPE: CPT

## 2018-06-20 PROCEDURE — 82330 ASSAY OF CALCIUM: CPT

## 2018-06-20 PROCEDURE — 97162 PT EVAL MOD COMPLEX 30 MIN: CPT

## 2018-06-20 PROCEDURE — 85610 PROTHROMBIN TIME: CPT

## 2018-06-20 PROCEDURE — 84145 PROCALCITONIN (PCT): CPT

## 2018-06-20 PROCEDURE — 85730 THROMBOPLASTIN TIME PARTIAL: CPT

## 2018-06-20 PROCEDURE — 83010 ASSAY OF HAPTOGLOBIN QUANT: CPT

## 2018-06-20 PROCEDURE — 85045 AUTOMATED RETICULOCYTE COUNT: CPT

## 2018-06-20 PROCEDURE — C1889: CPT

## 2018-06-20 PROCEDURE — 85652 RBC SED RATE AUTOMATED: CPT

## 2018-06-20 PROCEDURE — 87086 URINE CULTURE/COLONY COUNT: CPT

## 2018-06-20 PROCEDURE — 36430 TRANSFUSION BLD/BLD COMPNT: CPT

## 2018-06-20 PROCEDURE — 87040 BLOOD CULTURE FOR BACTERIA: CPT

## 2018-06-20 PROCEDURE — 87186 SC STD MICRODIL/AGAR DIL: CPT

## 2018-06-20 PROCEDURE — 84100 ASSAY OF PHOSPHORUS: CPT

## 2018-06-20 PROCEDURE — 86022 PLATELET ANTIBODIES: CPT

## 2018-06-20 PROCEDURE — 85240 CLOT FACTOR VIII AHG 1 STAGE: CPT

## 2018-06-20 PROCEDURE — 84156 ASSAY OF PROTEIN URINE: CPT

## 2018-06-20 PROCEDURE — 83615 LACTATE (LD) (LDH) ENZYME: CPT

## 2018-06-20 PROCEDURE — 88304 TISSUE EXAM BY PATHOLOGIST: CPT

## 2018-06-20 PROCEDURE — 86901 BLOOD TYPING SEROLOGIC RH(D): CPT

## 2018-06-20 PROCEDURE — 76000 FLUOROSCOPY <1 HR PHYS/QHP: CPT

## 2018-06-20 RX ORDER — OXYCODONE HYDROCHLORIDE 5 MG/1
1 TABLET ORAL
Qty: 30 | Refills: 0 | OUTPATIENT
Start: 2018-06-20

## 2018-06-20 RX ORDER — ACETAMINOPHEN 500 MG
2 TABLET ORAL
Qty: 0 | Refills: 0 | COMMUNITY
Start: 2018-06-20

## 2018-06-20 RX ORDER — SUMATRIPTAN SUCCINATE 4 MG/.5ML
1 INJECTION, SOLUTION SUBCUTANEOUS
Qty: 30 | Refills: 0 | OUTPATIENT
Start: 2018-06-20

## 2018-06-20 RX ORDER — SUMATRIPTAN SUCCINATE 4 MG/.5ML
1 INJECTION, SOLUTION SUBCUTANEOUS
Qty: 0 | Refills: 0 | COMMUNITY
Start: 2018-06-20

## 2018-06-20 RX ORDER — DOCUSATE SODIUM 100 MG
1 CAPSULE ORAL
Qty: 0 | Refills: 0 | COMMUNITY
Start: 2018-06-20

## 2018-06-20 RX ORDER — ASCORBIC ACID 60 MG
1 TABLET,CHEWABLE ORAL
Qty: 0 | Refills: 0 | COMMUNITY
Start: 2018-06-20

## 2018-06-20 RX ORDER — DIAZEPAM 5 MG
1 TABLET ORAL
Qty: 30 | Refills: 0 | OUTPATIENT
Start: 2018-06-20

## 2018-06-20 RX ORDER — DIAZEPAM 5 MG
1 TABLET ORAL
Qty: 0 | Refills: 0 | COMMUNITY
Start: 2018-06-20

## 2018-06-20 RX ORDER — SENNA PLUS 8.6 MG/1
2 TABLET ORAL
Qty: 0 | Refills: 0 | COMMUNITY
Start: 2018-06-20

## 2018-06-20 RX ORDER — OXYCODONE HYDROCHLORIDE 5 MG/1
1 TABLET ORAL
Qty: 0 | Refills: 0 | COMMUNITY
Start: 2018-06-20

## 2018-06-20 RX ORDER — ALPRAZOLAM 0.25 MG
1 TABLET ORAL
Qty: 0 | Refills: 0 | COMMUNITY

## 2018-06-20 RX ORDER — POLYETHYLENE GLYCOL 3350 17 G/17G
17 POWDER, FOR SOLUTION ORAL
Qty: 0 | Refills: 0 | COMMUNITY
Start: 2018-06-20

## 2018-06-20 RX ADMIN — SODIUM CHLORIDE 1 GRAM(S): 9 INJECTION INTRAMUSCULAR; INTRAVENOUS; SUBCUTANEOUS at 05:14

## 2018-06-20 RX ADMIN — DULOXETINE HYDROCHLORIDE 60 MILLIGRAM(S): 30 CAPSULE, DELAYED RELEASE ORAL at 11:34

## 2018-06-20 RX ADMIN — Medication 5 MILLIGRAM(S): at 05:14

## 2018-06-20 RX ADMIN — ENOXAPARIN SODIUM 40 MILLIGRAM(S): 100 INJECTION SUBCUTANEOUS at 05:14

## 2018-06-20 RX ADMIN — Medication 100 MILLIGRAM(S): at 05:14

## 2018-06-20 RX ADMIN — VALACYCLOVIR 2000 MILLIGRAM(S): 500 TABLET, FILM COATED ORAL at 05:14

## 2018-06-20 RX ADMIN — Medication 1 TABLET(S): at 11:34

## 2018-06-20 RX ADMIN — Medication 5 MILLIGRAM(S): at 11:33

## 2018-06-20 RX ADMIN — Medication 500 MILLIGRAM(S): at 05:14

## 2018-06-20 NOTE — DISCHARGE NOTE ADULT - MEDICATION SUMMARY - MEDICATIONS TO STOP TAKING
I will STOP taking the medications listed below when I get home from the hospital:    Xanax 0.5 mg oral tablet  -- 1 tab(s) by mouth 3 times a day    oxyCODONE-acetaminophen 5 mg-325 mg oral tablet  -- 1 tab(s) by mouth every 6 hours, As Needed

## 2018-06-20 NOTE — PROGRESS NOTE ADULT - SUBJECTIVE AND OBJECTIVE BOX
SUBJECTIVE: patient feels well.  and wants to go well    Vital Signs Last 24 Hrs  T(C): 37.1 (20 Jun 2018 05:12), Max: 37.1 (19 Jun 2018 11:44)  T(F): 98.8 (20 Jun 2018 05:12), Max: 98.8 (19 Jun 2018 11:44)  HR: 63 (20 Jun 2018 05:12) (63 - 83)  BP: 94/59 (20 Jun 2018 05:12) (85/52 - 110/71)  BP(mean): --  RR: 16 (20 Jun 2018 05:12) (16 - 18)  SpO2: 98% (20 Jun 2018 05:12) (94% - 99%)    PHYSICAL EXAM:    Constitutional: No Acute Distress     Neurological: AOx3, Following Commands, Moving all Extremities                                                  Sensation: [x] intact to light touch  [] decreased:     Pulmonary: Clear to Auscultation, No rales, No rhonchi, No wheezes     Cardiovascular: S1, S2, Regular rate and rhythm     Gastrointestinal: Soft, Non-tender, Non-distended     Extremities: No calf tenderness     Incision: c/d/i   LABS:                        8.8    6.0   )-----------( 297      ( 19 Jun 2018 09:31 )             25.0    06-19    134<L>  |  95<L>  |  9   ----------------------------<  100<H>  4.2   |  25  |  0.74    Ca    8.2<L>      19 Jun 2018 09:21        06-19 @ 07:01  -  06-20 @ 07:00  --------------------------------------------------------  IN: 1620 mL / OUT: 0 mL / NET: 1620 mL      DRAINS:     MEDICATIONS:  Anticoagulation:   enoxaparin Injectable 40 milliGRAM(s) SubCutaneous every 24 hours    Antibiotics:    Endo:    Neuro:  acetaminophen   Tablet 650 milliGRAM(s) Oral every 6 hours PRN For Temp greater than 38 C (100.4 F)  acetaminophen   Tablet. 650 milliGRAM(s) Oral every 6 hours PRN Mild Pain (1 - 3)  diazepam    Tablet 5 milliGRAM(s) Oral every 6 hours PRN muscle spasm  DULoxetine 60 milliGRAM(s) Oral daily  oxyCODONE    IR 5 milliGRAM(s) Oral every 4 hours PRN Moderate Pain (4 - 6)  SUMAtriptan 25 milliGRAM(s) Oral once    Cardiac:    Pulm:    GI/:  docusate sodium 100 milliGRAM(s) Oral three times a day  polyethylene glycol 3350 17 Gram(s) Oral daily  senna 2 Tablet(s) Oral at bedtime    Other:   ascorbic acid 500 milliGRAM(s) Oral two times a day  multivitamin 1 Tablet(s) Oral daily    DIET: regular     IMAGING:

## 2018-06-20 NOTE — PROGRESS NOTE ADULT - SUBJECTIVE AND OBJECTIVE BOX
CC: f/u for post op fever    Patient reports feeling well this AM,fevers have been moderating on no specific therapy.    REVIEW OF SYSTEMS:  All other review of systems negative (Comprehensive ROS)    Antimicrobials Day #  :off    Other Medications Reviewed    T(F): 98.9 (06-20-18 @ 07:53), Max: 98.9 (06-20-18 @ 07:53)  HR: 78 (06-20-18 @ 07:53)  BP: 99/56 (06-20-18 @ 07:53)  RR: 18 (06-20-18 @ 07:53)  SpO2: 97% (06-20-18 @ 07:53)  Wt(kg): --    PHYSICAL EXAM:  General: alert, no acute distress  Eyes:  anicteric, no conjunctival injection, no discharge  Oropharynx: no lesions or injection 	  Neck: supple, without adenopathy  Lungs: clear to auscultation  Heart: regular rate and rhythm; no murmur, rubs or gallops  Abdomen: soft, nondistended, nontender, without mass or organomegaly  Skin: no lesions  Extremities: no clubbing, cyanosis, or edema  Neurologic: alert, oriented, moves all extremities  Spine incision c/d/i  LAB RESULTS:                        8.8    6.0   )-----------( 297      ( 19 Jun 2018 09:31 )             25.0     06-20    137  |  99  |  10  ----------------------------<  97  4.3   |  24  |  0.69    Ca    8.3<L>      20 Jun 2018 10:45          MICROBIOLOGY:  RECENT CULTURES:  06-17 @ 09:57 .Urine Clean Catch (Midstream) Klebsiella pneumoniae    10,000 - 49,000 CFU/mL Klebsiella pneumoniae      06-17 @ 05:33 .Blood Blood-Peripheral     No growth to date.          RADIOLOGY REVIEWED:  CT of C/A/P 6/17 negative

## 2018-06-20 NOTE — PROGRESS NOTE ADULT - PROBLEM SELECTOR PLAN 3
Likely acute blood loss anemia due to surgery, also received a significant amount of IV fluids. s/p 1 unit PRBC on 6/15. stable Hb  Hematology input appreciated. Extensive work up negative- f/u recs
and anasarca - from volume overload (IVF and PRBC) and hypoalbuminemia.  Naturally diuresing - clinically much improved.  Urine protein negative - nephrotic syndrome ruled out
and anasarca - from volume overload, IVF and PRBC.   naturally diuresis - clinically much improved
and anasarca - from volume overload, IVF and PRBC. No proteinuria. Pt making adequate UOP. Self diuresing.
resolved
and anasarca - from volume overload (IVF and PRBC) and hypoalbuminemia.  Naturally diuresis - clinically much improved.  UA protein negative - nephrotic syndrome ruled out
and anasarca - from volume overload, IVF and PRBC. No proteinuria. Pt making adequate UOP. Self diuresing. Resolving

## 2018-06-20 NOTE — PROGRESS NOTE ADULT - PROBLEM SELECTOR PROBLEM 1
Spondylosis without myelopathy or radiculopathy, lumbar region
Hypotension, unspecified hypotension type
Fever, unspecified fever cause
Hypotension, unspecified hypotension type
Spondylosis without myelopathy or radiculopathy, lumbar region
Fever, unspecified fever cause

## 2018-06-20 NOTE — PROGRESS NOTE ADULT - ATTENDING COMMENTS
Heme consult  D/C home when H/H stabilizes
Pt seen and examined several times on overnight shift. Pt BP initially labile immediately post-op  with MAPs into mid 60s. Pt exhibited fluid responsiveness with boluses and transfusion of 2 units PRBCs. follow-up H and H showed hct of 26. additional 1 unit PRBCs given with appropriate increase in Hct to 29.   Neurological exam as above. BPs much improved with MAPS 75-80 currently. Urine output appropriate.  will f/u h/h with am labs. Anticipate tx to floor 4 fel[facility] pending bed availability. Family updated at bedside.
son at bedside questions answered
I will try to reach pt's PMD when available to discuss hospital course and follow up

## 2018-06-20 NOTE — PROGRESS NOTE ADULT - PROBLEM SELECTOR PROBLEM 3
Anemia due to blood loss
Anemia due to blood loss
Facial swelling

## 2018-06-20 NOTE — PROGRESS NOTE ADULT - PROBLEM SELECTOR PLAN 2
ID recs appreciated - inflammatory in nature, monitor off abx, fever curve is trending down
Hemodynamically stable however Hb continues to drop - Hb 7.5 today in setting of active output from hemovac drains. Trending CBC - no objection to additional unit of blood in setting of bleeding. Monitor coags, platelets.
ID recs appreciated - inflammatory in nature, monitor off abx, fever is resolved
Likely acute blood loss anemia due to surgery, also received a significant amount of IV fluids. s/p 1 unit PRBC on 6/15. stable Hb  Hematology input appreciated. Extensive work up negative- f/u recs
Likely acute blood loss anemia due to surgery, also received a significant amount of IV fluids. s/p 1 unit PRBC yesterday. Hg improved from 7 to 8.6 this morning.   Continue to monitor CBC daily.  Hematology input appreciated. Extensive work up negative.
Likely acute blood loss anemia due to surgery, also received a significant amount of IV fluids. s/p 1 unit PRBC on 6/15. Hg improved from 7 to 8 by today.  Continue to monitor CBC daily.  Hematology input appreciated. Extensive work up negative.
Overnight hypotensive, Hb 7.0 today in setting of continued drainage from hemovac drains though much improved output from yesterday. No objection to additional unit of blood in setting of bleeding. Monitor Hb, coags, platelets.    - NSGY has raised concern that persistent drainage may be result of a bleeding disorder. Mildly elevated PT which was originally normal, likely dilutional from transfusion and fluids. VW dz w/u sent. Consider heme eval if this condition does not improve.

## 2018-06-20 NOTE — PROGRESS NOTE ADULT - PROVIDER SPECIALTY LIST ADULT
Anesthesia
Hospitalist
Infectious Disease
Infectious Disease
NSICU
Neurosurgery
Hospitalist
Infectious Disease
Neurosurgery
Hospitalist

## 2018-06-20 NOTE — PROGRESS NOTE ADULT - ASSESSMENT
57 yo with redo lumbar laminectomy with hardware  No signs of infection preop  1500 cc blood loss at time of surgery  I am still concerned that post op fever is inflammatory and not infection driven.  Her fever has moderated  No signs of post op wound infection.  no localizing signs of infection.  Klebsiella in urine with a benign UA and no symptoms is colonizing mirian.  Inflammatory markers expected to be elevated in the post op setting  Will follow clinically, valtrex started earlier for 1 day for ? HSV outbreak  No role for antibacterial agents, no ID objection to discharge today.  She will monitor her status as outpatient

## 2018-06-20 NOTE — DISCHARGE NOTE ADULT - NS AS ACTIVITY OBS
No Heavy lifting/straining/Do not make important decisions/Do not drive or operate machinery/Walking-Indoors allowed/Walking-Outdoors allowed

## 2018-06-20 NOTE — PROGRESS NOTE ADULT - ASSESSMENT
HPI:  57 year old female with history of  Anxiety, chronic low back pain s/p  L3-L4 lumbar laminectomy and fusion  in . Pt has been c/o progressive  low back pain that radiates down the right leg with associated numbness and tingling for 3 moths followed by neurology tried pain medication without any relief s/p MRI revealed Junctional stenosis ,Grade 1 spondylolisthesis ,right synovial cyst . Presents to PST for scheduled Removal of Lumbar hardware  L2-5 decompression  L2- 3  L4 -5 posterior Lumbar Fusion  L2 - 5 Fusion on 2018. (2018 11:03)    PROCEDURE: Lumbar fusion       PAST MEDICAL & SURGICAL HISTORY:  Spondylosis without myelopathy or radiculopathy, lumbar region  Chronic Low Back Pain: since 10/2010  Aortic Regurgitation: mild - as per last Echo - -no change  Anxiety  Lumbar Disc Disorder  Thyroid Nodule: benign- last thyroid BX  follow endocrinologist  U/S every year  MVP (Mitral Valve Prolapse)  H/O meniscectomy of right knee:   History of laminectomy:   Lumbar Synovial Cyst  Excision of Scalp Cyst:   S/P Laparoscopic Procedure: for Infertility  Left Breast Cyst: - benign  S/P Dilatation and Curettage:  for miscarriage  S/P  Section: x3      PLAN:  Neuro: continue same medication discharge today  Respiratory: incentive spirometry   CV:stable     Heme/Onc:             DVT ppx: lovenox         Spectralink # [id number]

## 2018-06-20 NOTE — PROGRESS NOTE ADULT - PROBLEM SELECTOR PROBLEM 2
Fever, unspecified fever cause
Anemia due to blood loss
Fever, unspecified fever cause
Anemia due to blood loss
Anemia due to blood loss

## 2018-06-20 NOTE — DISCHARGE NOTE ADULT - MEDICATION SUMMARY - MEDICATIONS TO TAKE
I will START or STAY ON the medications listed below when I get home from the hospital:    acetaminophen 325 mg oral tablet  -- 2 tab(s) by mouth every 6 hours, As needed, For Temp greater than 38 C (100.4 F)  -- Indication: For prn fever    acetaminophen 325 mg oral tablet  -- 2 tab(s) by mouth every 6 hours, As needed, Mild Pain (1 - 3)  -- Indication: For prn pain    oxyCODONE 5 mg oral tablet  -- 1 tab(s) by mouth every 4 hours, As needed, Moderate Pain (4 - 6)  -- Indication: For prn pain    SUMAtriptan 25 mg oral tablet  -- 1 tab(s) by mouth once  -- Indication: For Headaches    diazePAM 5 mg oral tablet  -- 1 tab(s) by mouth every 6 hours, As needed, muscle spasm  -- Indication: For Spasm    Cymbalta 60 mg oral delayed release capsule  -- 1 cap(s) by mouth once a day  -- Indication: For depression    senna oral tablet  -- 2 tab(s) by mouth once a day (at bedtime)  -- Indication: For Stool softner    docusate sodium 100 mg oral capsule  -- 1 cap(s) by mouth 3 times a day  -- Indication: For Stool softner    polyethylene glycol 3350 oral powder for reconstitution  -- 17 gram(s) by mouth once a day  -- Indication: For Stool softner    Multiple Vitamins oral tablet  -- 1 tab(s) by mouth once a day  -- Indication: For Stool softner    ascorbic acid 500 mg oral tablet  -- 1 tab(s) by mouth 2 times a day  -- Indication: For Stool softner

## 2018-06-20 NOTE — DISCHARGE NOTE ADULT - CARE PROVIDER_API CALL
Joshua Avelar (MD), Neurological Surgery  [address] [place] [address] [address]  [place] [address]  Phone: [phone]  Fax: [phone]

## 2018-06-20 NOTE — DISCHARGE NOTE ADULT - PLAN OF CARE
6/12 l4-s1 plif extension of fusion to l2 resection of synovial cyst call office for appointment stable now switch back to xanax when off of valium

## 2018-06-20 NOTE — DISCHARGE NOTE ADULT - CARE PLAN
Principal Discharge DX:	Lumbar disc disorder  Goal:	6/12 l4-s1 plif extension of fusion to l2 resection of synovial cyst  Assessment and plan of treatment:	call office for appointment  Secondary Diagnosis:	Anemia due to blood loss  Goal:	stable now  Secondary Diagnosis:	Anxiety  Goal:	switch back to xanax when off of valium

## 2018-06-20 NOTE — DISCHARGE NOTE ADULT - HOSPITAL COURSE
57 year old female with history of  Anxiety, chronic low back pain s/p  L3-L4 lumbar laminectomy and fusion  in 2011. Pt has been c/o progressive  low back pain that radiates down the right leg with associated numbness and tingling for 3 moths followed by neurology tried pain medication without any relief s/p MRI revealed Junctional stenosis ,Grade 1 spondylolisthesis ,right synovial cyst . Patient had on June 12, 2018 l4-s1 plif extension of fusion l2 resection synovial cyst.  Patient had post operative anemia. Patient had post operative fever which is negative.  Patient was seen by physical therapy and recommended for home physical therapy and with rolling walker.  On day of discharge patient is medically and neurologically stable for discharge 57 year old female with history of  Anxiety, chronic low back pain s/p  L3-L4 lumbar laminectomy and fusion  in 2011. Pt has been c/o progressive  low back pain that radiates down the right leg with associated numbness and tingling for 3 moths followed by neurology tried pain medication without any relief s/p MRI revealed Junctional stenosis ,Grade 1 spondylolisthesis ,right synovial cyst . Patient had on June 12, 2018 l4-s1 plif extension of fusion l2 resection synovial cyst.  Patient had post operative anemia. Patient had post operative fever which is negative.  Patient was seen by physical therapy and recommended for outpatient physical therapy On day of discharge patient is medically and neurologically stable for discharge

## 2018-06-20 NOTE — PROGRESS NOTE ADULT - PROBLEM SELECTOR PLAN 10
Lovenox on hold, SCDs.

## 2018-06-20 NOTE — PROGRESS NOTE ADULT - SUBJECTIVE AND OBJECTIVE BOX
Authored by Dr Michael Pope [phone] [id number]    Patient is a 58y old  Female who presents with a chief complaint of "I need surgery for my lower back". (20 Jun 2018 09:35)    SUBJECTIVE / OVERNIGHT EVENTS: pt feels well, afebrile, BP stable    MEDICATIONS  (STANDING):  ascorbic acid 500 milliGRAM(s) Oral two times a day  docusate sodium 100 milliGRAM(s) Oral three times a day  DULoxetine 60 milliGRAM(s) Oral daily  enoxaparin Injectable 40 milliGRAM(s) SubCutaneous every 24 hours  multivitamin 1 Tablet(s) Oral daily  polyethylene glycol 3350 17 Gram(s) Oral daily  senna 2 Tablet(s) Oral at bedtime  SUMAtriptan 25 milliGRAM(s) Oral once    MEDICATIONS  (PRN):  acetaminophen   Tablet 650 milliGRAM(s) Oral every 6 hours PRN For Temp greater than 38 C (100.4 F)  acetaminophen   Tablet. 650 milliGRAM(s) Oral every 6 hours PRN Mild Pain (1 - 3)  diazepam    Tablet 5 milliGRAM(s) Oral every 6 hours PRN muscle spasm  oxyCODONE    IR 5 milliGRAM(s) Oral every 4 hours PRN Moderate Pain (4 - 6)      Vital Signs Last 24 Hrs  T(C): 37.2 (20 Jun 2018 11:50), Max: 37.2 (20 Jun 2018 07:53)  T(F): 98.9 (20 Jun 2018 11:50), Max: 98.9 (20 Jun 2018 07:53)  HR: 70 (20 Jun 2018 11:50) (63 - 81)  BP: 106/64 (20 Jun 2018 11:50) (94/59 - 106/64)  BP(mean): --  RR: 18 (20 Jun 2018 11:50) (16 - 18)  SpO2: 98% (20 Jun 2018 11:50) (94% - 98%)  CAPILLARY BLOOD GLUCOSE        I&O's Summary    19 Jun 2018 07:01  -  20 Jun 2018 07:00  --------------------------------------------------------  IN: 1620 mL / OUT: 0 mL / NET: 1620 mL    20 Jun 2018 07:01  -  20 Jun 2018 17:19  --------------------------------------------------------  IN: 1040 mL / OUT: 2 mL / NET: 1038 mL        PHYSICAL EXAM  GENERAL: NAD, calm  HEAD:  Atraumatic, Normocephalic  EYES: EOMI, conjunctiva and sclera clear  ENMT: Moist mucous membranes, interior lower lip ulceration  NECK: Supple, No JVD  RESPIRATORY: Clear to auscultation bilaterally; No rales, rhonchi, wheezing, or rubs  CARDIOVASCULAR: Regular rate and rhythm; S1, S2, No murmurs, rubs, or gallops  GASTROINTESTINAL: Soft, Nontender, Nondistended; Bowel sounds present  EXTREMITIES:  No edema  NEURO:  Alert & Oriented X3; Moving all 4 extremities; No gross sensory deficits  PSYCH: increasingly anxious  SKIN: lumbar staples in place post-op - area appears clean, dry, intact. No evidence of cellulitis.     LABS:                        8.8    6.0   )-----------( 297      ( 19 Jun 2018 09:31 )             25.0     06-20    137  |  99  |  10  ----------------------------<  97  4.3   |  24  |  0.69    Ca    8.3<L>      20 Jun 2018 10:45        RADIOLOGY & ADDITIONAL TESTS:    Imaging Personally Reviewed:  Consultant(s) Notes Reviewed:    Care Discussed with Consultants/Other Providers:

## 2018-06-22 ENCOUNTER — CLINICAL ADVICE (OUTPATIENT)
Age: 58
End: 2018-06-22

## 2018-06-22 LAB
CULTURE RESULTS: SIGNIFICANT CHANGE UP
CULTURE RESULTS: SIGNIFICANT CHANGE UP
SPECIMEN SOURCE: SIGNIFICANT CHANGE UP
SPECIMEN SOURCE: SIGNIFICANT CHANGE UP

## 2018-06-27 ENCOUNTER — CLINICAL ADVICE (OUTPATIENT)
Age: 58
End: 2018-06-27

## 2018-06-29 LAB — VWF CBA/VWF AG PPP IA-RTO: SIGNIFICANT CHANGE UP

## 2018-07-02 ENCOUNTER — APPOINTMENT (OUTPATIENT)
Dept: SPINE | Facility: CLINIC | Age: 58
End: 2018-07-02
Payer: COMMERCIAL

## 2018-07-02 VITALS
DIASTOLIC BLOOD PRESSURE: 78 MMHG | WEIGHT: 180 LBS | HEIGHT: 65 IN | BODY MASS INDEX: 29.99 KG/M2 | SYSTOLIC BLOOD PRESSURE: 113 MMHG | HEART RATE: 88 BPM

## 2018-07-02 PROCEDURE — 99024 POSTOP FOLLOW-UP VISIT: CPT

## 2018-07-09 ENCOUNTER — RX RENEWAL (OUTPATIENT)
Age: 58
End: 2018-07-09

## 2018-07-17 ENCOUNTER — OTHER (OUTPATIENT)
Age: 58
End: 2018-07-17

## 2018-07-27 ENCOUNTER — OTHER (OUTPATIENT)
Age: 58
End: 2018-07-27

## 2018-07-27 ENCOUNTER — CLINICAL ADVICE (OUTPATIENT)
Age: 58
End: 2018-07-27

## 2018-07-27 ENCOUNTER — RX RENEWAL (OUTPATIENT)
Age: 58
End: 2018-07-27

## 2018-07-27 RX ORDER — DIAZEPAM 5 MG/1
5 TABLET ORAL EVERY 6 HOURS
Qty: 56 | Refills: 0 | Status: DISCONTINUED | COMMUNITY
Start: 2018-07-27 | End: 2018-07-27

## 2018-08-03 ENCOUNTER — APPOINTMENT (OUTPATIENT)
Dept: RADIOLOGY | Facility: HOSPITAL | Age: 58
End: 2018-08-03

## 2018-08-03 ENCOUNTER — OUTPATIENT (OUTPATIENT)
Dept: OUTPATIENT SERVICES | Facility: HOSPITAL | Age: 58
LOS: 1 days | End: 2018-08-03
Payer: COMMERCIAL

## 2018-08-03 DIAGNOSIS — Z98.890 OTHER SPECIFIED POSTPROCEDURAL STATES: ICD-10-CM

## 2018-08-03 DIAGNOSIS — Z98.890 OTHER SPECIFIED POSTPROCEDURAL STATES: Chronic | ICD-10-CM

## 2018-08-03 PROBLEM — M47.816 SPONDYLOSIS WITHOUT MYELOPATHY OR RADICULOPATHY, LUMBAR REGION: Chronic | Status: ACTIVE | Noted: 2018-06-05

## 2018-08-03 PROCEDURE — 72083 X-RAY EXAM ENTIRE SPI 4/5 VW: CPT | Mod: 26

## 2018-08-06 ENCOUNTER — APPOINTMENT (OUTPATIENT)
Dept: SPINE | Facility: CLINIC | Age: 58
End: 2018-08-06
Payer: COMMERCIAL

## 2018-08-06 ENCOUNTER — APPOINTMENT (OUTPATIENT)
Dept: SPINE | Facility: CLINIC | Age: 58
End: 2018-08-06

## 2018-08-06 VITALS
HEART RATE: 77 BPM | HEIGHT: 65 IN | SYSTOLIC BLOOD PRESSURE: 132 MMHG | WEIGHT: 161 LBS | BODY MASS INDEX: 26.82 KG/M2 | DIASTOLIC BLOOD PRESSURE: 88 MMHG

## 2018-08-06 PROCEDURE — 99024 POSTOP FOLLOW-UP VISIT: CPT

## 2018-08-06 RX ORDER — OXYCODONE AND ACETAMINOPHEN 5; 325 MG/1; MG/1
5-325 TABLET ORAL EVERY 6 HOURS
Qty: 56 | Refills: 0 | Status: COMPLETED | COMMUNITY
Start: 2018-05-23 | End: 2018-08-06

## 2018-08-06 RX ORDER — BACLOFEN 10 MG/1
10 TABLET ORAL TWICE DAILY
Qty: 20 | Refills: 0 | Status: COMPLETED | COMMUNITY
Start: 2018-06-22 | End: 2018-08-06

## 2018-08-06 RX ORDER — OXYCODONE AND ACETAMINOPHEN 5; 325 MG/1; MG/1
5-325 TABLET ORAL EVERY 6 HOURS
Qty: 120 | Refills: 0 | Status: COMPLETED | COMMUNITY
Start: 2018-07-09 | End: 2018-08-06

## 2018-08-06 RX ORDER — OXYCODONE AND ACETAMINOPHEN 5; 325 MG/1; MG/1
5-325 TABLET ORAL EVERY 6 HOURS
Qty: 56 | Refills: 0 | Status: COMPLETED | COMMUNITY
Start: 2018-05-11 | End: 2018-08-06

## 2018-08-06 RX ORDER — METHYLPREDNISOLONE 4 MG/1
4 TABLET ORAL
Qty: 1 | Refills: 0 | Status: COMPLETED | COMMUNITY
Start: 2018-07-27 | End: 2018-08-06

## 2018-08-06 RX ORDER — DIAZEPAM 5 MG/1
5 TABLET ORAL EVERY 8 HOURS
Qty: 90 | Refills: 0 | Status: COMPLETED | COMMUNITY
Start: 2018-07-09 | End: 2018-08-06

## 2018-08-06 RX ORDER — OXYCODONE 5 MG/1
5 TABLET ORAL
Qty: 56 | Refills: 0 | Status: COMPLETED | COMMUNITY
Start: 2018-06-27 | End: 2018-08-06

## 2018-08-06 RX ORDER — OXYCODONE HYDROCHLORIDE 5 MG/1
5 CAPSULE ORAL EVERY 6 HOURS
Qty: 56 | Refills: 0 | Status: COMPLETED | COMMUNITY
Start: 2018-06-07 | End: 2018-08-06

## 2018-08-06 RX ORDER — DIAZEPAM 5 MG/1
5 TABLET ORAL EVERY 6 HOURS
Qty: 56 | Refills: 0 | Status: COMPLETED | COMMUNITY
Start: 2018-06-27 | End: 2018-08-06

## 2018-08-06 RX ORDER — ALPRAZOLAM 2 MG/1
TABLET ORAL
Refills: 0 | Status: COMPLETED | COMMUNITY
End: 2018-08-06

## 2018-08-06 RX ORDER — OXYCODONE 5 MG/1
5 TABLET ORAL
Qty: 56 | Refills: 0 | Status: COMPLETED | COMMUNITY
Start: 2018-06-08 | End: 2018-08-06

## 2018-08-08 ENCOUNTER — RX CHANGE (OUTPATIENT)
Age: 58
End: 2018-08-08

## 2018-08-09 ENCOUNTER — APPOINTMENT (OUTPATIENT)
Dept: SPINE | Facility: CLINIC | Age: 58
End: 2018-08-09

## 2018-08-27 ENCOUNTER — APPOINTMENT (OUTPATIENT)
Dept: SPINE | Facility: CLINIC | Age: 58
End: 2018-08-27
Payer: COMMERCIAL

## 2018-08-27 VITALS
HEIGHT: 65 IN | BODY MASS INDEX: 26.82 KG/M2 | DIASTOLIC BLOOD PRESSURE: 85 MMHG | WEIGHT: 161 LBS | HEART RATE: 73 BPM | SYSTOLIC BLOOD PRESSURE: 123 MMHG

## 2018-08-27 PROCEDURE — 99024 POSTOP FOLLOW-UP VISIT: CPT

## 2018-08-27 RX ORDER — DIAZEPAM 5 MG/1
5 TABLET ORAL EVERY 6 HOURS
Qty: 56 | Refills: 0 | Status: ACTIVE | COMMUNITY
Start: 2018-08-27 | End: 1900-01-01

## 2018-09-20 ENCOUNTER — APPOINTMENT (OUTPATIENT)
Dept: PHYSICAL MEDICINE AND REHAB | Facility: CLINIC | Age: 58
End: 2018-09-20
Payer: COMMERCIAL

## 2018-09-20 VITALS
OXYGEN SATURATION: 98 % | TEMPERATURE: 98.2 F | DIASTOLIC BLOOD PRESSURE: 73 MMHG | SYSTOLIC BLOOD PRESSURE: 104 MMHG | HEART RATE: 64 BPM

## 2018-09-20 PROCEDURE — 99204 OFFICE O/P NEW MOD 45 MIN: CPT | Mod: GC

## 2018-10-07 ENCOUNTER — FORM ENCOUNTER (OUTPATIENT)
Age: 58
End: 2018-10-07

## 2018-10-08 ENCOUNTER — OUTPATIENT (OUTPATIENT)
Dept: OUTPATIENT SERVICES | Facility: HOSPITAL | Age: 58
LOS: 1 days | End: 2018-10-08
Payer: COMMERCIAL

## 2018-10-08 ENCOUNTER — APPOINTMENT (OUTPATIENT)
Dept: MRI IMAGING | Facility: CLINIC | Age: 58
End: 2018-10-08
Payer: COMMERCIAL

## 2018-10-08 DIAGNOSIS — Z98.890 OTHER SPECIFIED POSTPROCEDURAL STATES: Chronic | ICD-10-CM

## 2018-10-08 DIAGNOSIS — M25.552 PAIN IN LEFT HIP: ICD-10-CM

## 2018-10-08 PROCEDURE — 73721 MRI JNT OF LWR EXTRE W/O DYE: CPT

## 2018-10-08 PROCEDURE — 73721 MRI JNT OF LWR EXTRE W/O DYE: CPT | Mod: 26,LT

## 2018-10-10 ENCOUNTER — APPOINTMENT (OUTPATIENT)
Dept: PHYSICAL MEDICINE AND REHAB | Facility: CLINIC | Age: 58
End: 2018-10-10

## 2018-10-11 ENCOUNTER — APPOINTMENT (OUTPATIENT)
Dept: PHYSICAL MEDICINE AND REHAB | Facility: CLINIC | Age: 58
End: 2018-10-11
Payer: COMMERCIAL

## 2018-10-11 VITALS
OXYGEN SATURATION: 97 % | TEMPERATURE: 98.3 F | SYSTOLIC BLOOD PRESSURE: 124 MMHG | DIASTOLIC BLOOD PRESSURE: 94 MMHG | HEART RATE: 71 BPM

## 2018-10-11 PROCEDURE — 76942 ECHO GUIDE FOR BIOPSY: CPT | Mod: LT

## 2018-10-11 PROCEDURE — 20552 NJX 1/MLT TRIGGER POINT 1/2: CPT

## 2018-10-12 ENCOUNTER — TRANSCRIPTION ENCOUNTER (OUTPATIENT)
Age: 58
End: 2018-10-12

## 2018-10-18 ENCOUNTER — RX RENEWAL (OUTPATIENT)
Age: 58
End: 2018-10-18

## 2018-10-26 ENCOUNTER — APPOINTMENT (OUTPATIENT)
Dept: PHYSICAL MEDICINE AND REHAB | Facility: CLINIC | Age: 58
End: 2018-10-26
Payer: COMMERCIAL

## 2018-10-26 VITALS — DIASTOLIC BLOOD PRESSURE: 77 MMHG | HEART RATE: 66 BPM | SYSTOLIC BLOOD PRESSURE: 114 MMHG

## 2018-10-26 PROCEDURE — 20605 DRAIN/INJ JOINT/BURSA W/O US: CPT | Mod: 50

## 2018-10-29 ENCOUNTER — TRANSCRIPTION ENCOUNTER (OUTPATIENT)
Age: 58
End: 2018-10-29

## 2018-11-09 ENCOUNTER — APPOINTMENT (OUTPATIENT)
Dept: PHYSICAL MEDICINE AND REHAB | Facility: CLINIC | Age: 58
End: 2018-11-09
Payer: COMMERCIAL

## 2018-11-09 VITALS — SYSTOLIC BLOOD PRESSURE: 146 MMHG | OXYGEN SATURATION: 97 % | HEART RATE: 75 BPM | DIASTOLIC BLOOD PRESSURE: 74 MMHG

## 2018-11-09 PROCEDURE — 99214 OFFICE O/P EST MOD 30 MIN: CPT

## 2018-11-15 ENCOUNTER — CHART COPY (OUTPATIENT)
Age: 58
End: 2018-11-15

## 2018-11-26 ENCOUNTER — APPOINTMENT (OUTPATIENT)
Dept: SPINE | Facility: CLINIC | Age: 58
End: 2018-11-26

## 2019-01-28 ENCOUNTER — APPOINTMENT (OUTPATIENT)
Dept: RADIOLOGY | Facility: CLINIC | Age: 59
End: 2019-01-28
Payer: COMMERCIAL

## 2019-01-28 ENCOUNTER — OUTPATIENT (OUTPATIENT)
Dept: OUTPATIENT SERVICES | Facility: HOSPITAL | Age: 59
LOS: 1 days | End: 2019-01-28
Payer: COMMERCIAL

## 2019-01-28 ENCOUNTER — RX RENEWAL (OUTPATIENT)
Age: 59
End: 2019-01-28

## 2019-01-28 DIAGNOSIS — Z98.890 OTHER SPECIFIED POSTPROCEDURAL STATES: ICD-10-CM

## 2019-01-28 DIAGNOSIS — Z98.890 OTHER SPECIFIED POSTPROCEDURAL STATES: Chronic | ICD-10-CM

## 2019-01-28 PROCEDURE — 72100 X-RAY EXAM L-S SPINE 2/3 VWS: CPT

## 2019-01-28 PROCEDURE — 72100 X-RAY EXAM L-S SPINE 2/3 VWS: CPT | Mod: 26

## 2019-01-29 ENCOUNTER — APPOINTMENT (OUTPATIENT)
Dept: PHYSICAL MEDICINE AND REHAB | Facility: CLINIC | Age: 59
End: 2019-01-29
Payer: COMMERCIAL

## 2019-01-29 ENCOUNTER — OUTPATIENT (OUTPATIENT)
Dept: OUTPATIENT SERVICES | Facility: HOSPITAL | Age: 59
LOS: 1 days | End: 2019-01-29
Payer: COMMERCIAL

## 2019-01-29 DIAGNOSIS — Z98.890 OTHER SPECIFIED POSTPROCEDURAL STATES: Chronic | ICD-10-CM

## 2019-01-29 DIAGNOSIS — M54.16 RADICULOPATHY, LUMBAR REGION: ICD-10-CM

## 2019-01-29 PROCEDURE — 77003 FLUOROGUIDE FOR SPINE INJECT: CPT

## 2019-01-29 PROCEDURE — 27096 INJECT SACROILIAC JOINT: CPT | Mod: 50

## 2019-01-29 PROCEDURE — G0260: CPT

## 2019-01-31 ENCOUNTER — TRANSCRIPTION ENCOUNTER (OUTPATIENT)
Age: 59
End: 2019-01-31

## 2019-02-01 DIAGNOSIS — M46.1 SACROILIITIS, NOT ELSEWHERE CLASSIFIED: ICD-10-CM

## 2019-02-04 ENCOUNTER — APPOINTMENT (OUTPATIENT)
Dept: SPINE | Facility: CLINIC | Age: 59
End: 2019-02-04
Payer: COMMERCIAL

## 2019-02-04 VITALS
DIASTOLIC BLOOD PRESSURE: 79 MMHG | HEIGHT: 64 IN | OXYGEN SATURATION: 96 % | RESPIRATION RATE: 15 BRPM | SYSTOLIC BLOOD PRESSURE: 125 MMHG | HEART RATE: 73 BPM

## 2019-02-04 DIAGNOSIS — Z98.890 OTHER SPECIFIED POSTPROCEDURAL STATES: ICD-10-CM

## 2019-02-04 PROCEDURE — 99213 OFFICE O/P EST LOW 20 MIN: CPT

## 2019-02-04 RX ORDER — OXYCODONE AND ACETAMINOPHEN 5; 325 MG/1; MG/1
5-325 TABLET ORAL
Qty: 112 | Refills: 0 | Status: DISCONTINUED | COMMUNITY
Start: 2018-08-27 | End: 2019-02-04

## 2019-02-04 RX ORDER — DIAZEPAM 5 MG/1
5 TABLET ORAL EVERY 6 HOURS
Qty: 84 | Refills: 0 | Status: DISCONTINUED | COMMUNITY
Start: 2018-08-08 | End: 2019-02-04

## 2019-02-04 RX ORDER — OXYCODONE AND ACETAMINOPHEN 5; 325 MG/1; MG/1
5-325 TABLET ORAL
Qty: 126 | Refills: 0 | Status: DISCONTINUED | COMMUNITY
Start: 2018-08-08 | End: 2019-02-04

## 2019-02-04 RX ORDER — DIAZEPAM 10 MG/1
10 TABLET ORAL 4 TIMES DAILY
Qty: 28 | Refills: 0 | Status: DISCONTINUED | COMMUNITY
Start: 2018-07-27 | End: 2019-02-04

## 2019-02-04 RX ORDER — OXYCODONE AND ACETAMINOPHEN 5; 325 MG/1; MG/1
5-325 TABLET ORAL EVERY 6 HOURS
Qty: 80 | Refills: 0 | Status: DISCONTINUED | COMMUNITY
Start: 2018-07-27 | End: 2019-02-04

## 2019-02-04 NOTE — ASSESSMENT
[FreeTextEntry1] : improving. Continue with physical therapyand return in 4 months with followup x-rays

## 2019-02-04 NOTE — REASON FOR VISIT
[Follow-Up: _____] : a [unfilled] follow-up visit [FreeTextEntry1] : Continues to improve. Recent x-rays show no change in hardware or alignment. No longer having buttock pain. Pain is more in the area of the SI joint which is getting injections. No longer taking any narcotics.

## 2019-02-08 ENCOUNTER — RX RENEWAL (OUTPATIENT)
Age: 59
End: 2019-02-08

## 2019-02-08 RX ORDER — DICLOFENAC SODIUM 50 MG/1
50 TABLET, DELAYED RELEASE ORAL
Qty: 30 | Refills: 0 | Status: ACTIVE | COMMUNITY
Start: 2019-01-28 | End: 1900-01-01

## 2019-02-19 ENCOUNTER — RX RENEWAL (OUTPATIENT)
Age: 59
End: 2019-02-19

## 2019-03-11 ENCOUNTER — TRANSCRIPTION ENCOUNTER (OUTPATIENT)
Age: 59
End: 2019-03-11

## 2019-03-13 ENCOUNTER — APPOINTMENT (OUTPATIENT)
Dept: PHYSICAL MEDICINE AND REHAB | Facility: CLINIC | Age: 59
End: 2019-03-13
Payer: COMMERCIAL

## 2019-03-13 VITALS — DIASTOLIC BLOOD PRESSURE: 82 MMHG | SYSTOLIC BLOOD PRESSURE: 136 MMHG | OXYGEN SATURATION: 96 % | HEART RATE: 68 BPM

## 2019-03-13 DIAGNOSIS — M79.18 MYALGIA, OTHER SITE: ICD-10-CM

## 2019-03-13 DIAGNOSIS — M25.552 PAIN IN LEFT HIP: ICD-10-CM

## 2019-03-13 PROCEDURE — 99214 OFFICE O/P EST MOD 30 MIN: CPT

## 2019-03-14 ENCOUNTER — TRANSCRIPTION ENCOUNTER (OUTPATIENT)
Age: 59
End: 2019-03-14

## 2019-03-14 PROBLEM — M79.18 GLUTEAL PAIN: Status: ACTIVE | Noted: 2018-08-27

## 2019-03-14 PROBLEM — M25.552 LEFT-SIDED ISCHIAL PAIN: Status: ACTIVE | Noted: 2018-09-20

## 2019-03-14 NOTE — ASSESSMENT
[FreeTextEntry1] : This is CESAR JACOBS,  a 58 year-old female here for bilateral gluteal pain, left worse than right.\par \par RIGHT HIP:\par 1. Partial-thickness tearing enthesopathy at the insertion of the right gluteus medius tendon. Right greater trochanteric bursitis. \par 2. SIJ pain (improved after US guided injection b/w PSIS and sacrum).\par \par LEFT:\par 1. Gluteal pain: likely due to avulsion/tendonopathy of left obturator internus as well as GTPS, see pelvic MRI.\par 2. Low-grade partial-thickness tearing at the origin of the left hamstring tendons. \par 3.  SIJ pain  (improved after US guided injection b/w PSIS and sacrum).\par \par \par Plan:\par 1. FU with Dr. Armenta for ultrasound guided bursa injection, trigger point injection\par 2. SI joint pain is improved partially\par 3. Recommend PT for core strenghtening, modalities\par \par Medications have been discussed and reconciled, adverse reactions and side effects have been reviewed.\par \par

## 2019-03-14 NOTE — HISTORY OF PRESENT ILLNESS
[FreeTextEntry1] : FU 3/13/19: Mrs. Jacobs is here for follow up following SI joint injections. She reports minimal prolonged relief. Denies any complications from her procedure. Her pain is localized to her right > left hip gluteal muscles and greater trochanter. She is interested in repeat injections. She has not been to PT, recently was cleared to go to PT by her surgeon but has not started. No new neurological changes, bowel or bladder incontinence, saddle anesthesia. \par \par FU: 11-9-18:  Tracy is feeling 60% better since bilat SIJ injections done at last visit.  Pain persists on left just below the PSIS region, again in region of SIJ.    She is willing to trial an inferior approach to bilat SIJ with fluoro guidance. \par \par 10-26-18: Bilat SIJ injections\par \par 10-11-18: left obturator internus steroid inj.\par \par IE 10-11-18\par Ms. TRACY JACOBS is a 59yo female who had a recent L2-S1 PLIF with Dr. Avelar, presenting today for follow-up of bilateral gluteal pain. She began noticing the pain worsening after her spine surgery and thinks it may be related to increased time spent sitting/lying down while recovering.  She continues to describe the pain as worse with sitting and radiating to left groin and lateral thighs. Ms. Jacobs describes the pain as feeling like she has just completed a vigorous workout, though she is not currently doing any significant exercise due to recent surgery.  She continues to deny LE weakness, numbness and/or tingling, or bowel/bladder incontinence. \par \par \par Pain Description: constant soreness bilateral buttocks\par Pain Scale: 9/10 with sitting\par Relieving factors include getting up from seated position. \par Bowel/Bladder Incontinence: Denies\par Lower extremity weakness: Denies\par --------------------------------------------------------\par Imaging Review\par MRI LS Spine 8-22-18 performed at [facility]:\par Significant improvement in diameter of sinal canal at L2-3 and L4-5 levels. Mild foraminal narrowing L2-3, L4-5 levels. Very small right parasagittal disc herniation at T12-L1 level similar to prior study. \par \par Ultrasound of left short external rotators.\par Specifically tender over left ischium where cortical irregularity / avusion / enthesopathy noted where obturator internus courses. Tenderness to sonopalpation over the insertion of the short external rotator tendons to the posterior GT attachment sites. \par \par "PROCEDURE DATE: 10/08/2018 \par \par \par \par INTERPRETATION: \par LEFT HIP MRI \par \par CLINICAL INDICATION: Bilateral gluteal pain. Radiating to the left groin and \par lateral thighs. \par TECHNIQUE: Multiplanar, multisequence MRI was obtained of the pelvis/left \par hip. \par \par FINDINGS: \par \par JOINT: There is mild right hip joint space narrowing. There is no \par full-thickness chondral defect. There is a trace right hip joint effusion. \par MUSCLE AND TENDONS: There is no isolated muscle edema or atrophy. There is \par bilateral peritendinosis at the insertion of the gluteus medius and minimus \par tendons with enthesopathy and low-grade focal partial-thickness tearing at \par the insertion of the right gluteus medius tendon with focal cystic change \par within the adjacent trochanter. Small amount of fluid is visualized within \par the right greater trochanter bursa, consistent with bursitis. There is \par low-grade partial-thickness tearing at the origin of the left hamstring \par tendons. Trace edema is visualized within the left obturator internus as it \par courses over the left ischial tuberosity (series 3, image 23). The iliopsoas \par and adductors are intact. \par BONE: There is no osseous edema, fracture or osseous necrosis. Lumbar \par instrumentation hardware is visualized with associated susceptibility \par artifact. Mild degenerative change of the bilateral sacroiliac joints with \par focal area of subchondral edema within the anterior inferior right \par sacroiliac articulation. Pubic symphysis is intact. \par NEUROVASCULAR STRUCTURES: Course the neurovascular structures is \par unremarkable. \par INTRAPELVIC AND PERIPHERAL SOFT TISSUES: Small uterine leiomyomas. \par \par IMPRESSION: \par Bilateral gluteus minimus and medius peritendinosis with low-grade \par partial-thickness tearing enthesopathy at the insertion of the right gluteus \par medius tendon. Right greater trochanteric bursitis. Low-grade \par partial-thickness tearing at the origin of the left hamstring tendons. \par Mild right hip joint space narrowing and trace right hip joint effusion "\par \par

## 2019-04-02 ENCOUNTER — RX RENEWAL (OUTPATIENT)
Age: 59
End: 2019-04-02

## 2019-04-03 ENCOUNTER — APPOINTMENT (OUTPATIENT)
Dept: PHYSICAL MEDICINE AND REHAB | Facility: CLINIC | Age: 59
End: 2019-04-03

## 2019-04-19 RX ORDER — TIZANIDINE 2 MG/1
2 TABLET ORAL
Qty: 30 | Refills: 2 | Status: ACTIVE | COMMUNITY
Start: 2018-09-21 | End: 1900-01-01

## 2019-06-06 ENCOUNTER — TRANSCRIPTION ENCOUNTER (OUTPATIENT)
Age: 59
End: 2019-06-06

## 2019-06-10 ENCOUNTER — APPOINTMENT (OUTPATIENT)
Dept: SPINE | Facility: CLINIC | Age: 59
End: 2019-06-10

## 2019-06-12 ENCOUNTER — RX RENEWAL (OUTPATIENT)
Age: 59
End: 2019-06-12

## 2019-07-10 NOTE — PATIENT PROFILE ADULT. - NS PRO OT REFERRAL QUES 2 YN
Message from [employer]:  States she is calling in regards to a phone call she received about making an appointment.  
Spoke to Koki ,she was in [facility] for 2 days due to \"kidney infection \"  D/c date 6/30/19 she is feeling much better and has f/u with Urologist next week . She wants to see if Urologist advises she f/u with us yet. She will let us know  
no

## 2019-07-11 ENCOUNTER — RX RENEWAL (OUTPATIENT)
Age: 59
End: 2019-07-11

## 2019-07-12 NOTE — DISCHARGE NOTE ADULT - NS AS DC PROVIDER CONTACT Y/N MULTI
Bed search:     Hardeep: No single bed available    Jeanajudy: Stated that they are waiting for [facility] to call and negotiate single bed  CM provided Perez Moranlulú [phone] number from [facility]  CM also called Naz to reach out to [facility]  CM then spoke to [facility] at [address] who stated that they are trying to negotiate single bed with [facility] mentioned that both her and Guerita Laureano are working together and she will let me know if [facility] is able to accept patient today  CM will contact [facility] to receive update  [facility]: CM also left message for Clarisa from [address] [place] [address] to call CM back regarding patient's review process  Yes

## 2019-08-19 ENCOUNTER — RX RENEWAL (OUTPATIENT)
Age: 59
End: 2019-08-19

## 2019-08-28 ENCOUNTER — APPOINTMENT (OUTPATIENT)
Dept: PHYSICAL MEDICINE AND REHAB | Facility: CLINIC | Age: 59
End: 2019-08-28
Payer: COMMERCIAL

## 2019-08-28 VITALS — DIASTOLIC BLOOD PRESSURE: 77 MMHG | OXYGEN SATURATION: 97 % | HEART RATE: 71 BPM | SYSTOLIC BLOOD PRESSURE: 112 MMHG

## 2019-08-28 DIAGNOSIS — M54.30 SCIATICA, UNSPECIFIED SIDE: ICD-10-CM

## 2019-08-28 DIAGNOSIS — M48.00 SPINAL STENOSIS, SITE UNSPECIFIED: ICD-10-CM

## 2019-08-28 PROCEDURE — 99214 OFFICE O/P EST MOD 30 MIN: CPT | Mod: 25

## 2019-08-28 PROCEDURE — 20611 DRAIN/INJ JOINT/BURSA W/US: CPT | Mod: RT

## 2019-08-29 ENCOUNTER — TRANSCRIPTION ENCOUNTER (OUTPATIENT)
Age: 59
End: 2019-08-29

## 2019-08-29 PROBLEM — M54.30 SCIATICA: Status: ACTIVE | Noted: 2018-07-27

## 2019-08-29 PROBLEM — M48.00 SPINAL STENOSIS: Status: ACTIVE | Noted: 2018-04-09

## 2019-08-29 NOTE — HISTORY OF PRESENT ILLNESS
[FreeTextEntry1] : FU 8/28/19: Mrs. Jacobs is here for follow up for bilateral gluteal pain/ sacroiliac pain R>L.  She states since her last visit her pain is persistent localizing from R gluteal muscle and greater trochanter area.  She did see physical therapy and states she had minimal relief as all they did was therapeutic modalities rather than working on exercises.  She has been using her [employer] bike 2-3 times per week which aggravates the pain.   Changes to her pain is notable for radiation of pain from R SI joint to R hip and radiating down to knee.  She is inquiring about a SI fusion surgery given her pain not being relieved. \par \par Location: \par Quality: deep, ache\par Timing: constant\par Severity: 9/10\par Exacerbating: activity, prolonged sitting\par Relieving: PT therapies temporarily. \par Prior treatments: SI joint injection improved sx. \par Meds: tylenol, diclofenac, amitriptyline \par ROS: denies B/B incontinence, new weakness, numbness or tingling. \par \par \par FU 3/13/19: Mrs. Jacobs is here for follow up following SI joint injections. She reports minimal prolonged relief. Denies any complications from her procedure. Her pain is localized to her right > left hip gluteal muscles and greater trochanter. She is interested in repeat injections. She has not been to PT, recently was cleared to go to PT by her surgeon but has not started. No new neurological changes, bowel or bladder incontinence, saddle anesthesia. \par \par FU: 11-9-18:  Tracy is feeling 60% better since bilat SIJ injections done at last visit.  Pain persists on left just below the PSIS region, again in region of SIJ.    She is willing to trial an inferior approach to bilat SIJ with fluoro guidance. \par \par 10-26-18: Bilat SIJ injections\par \par 10-11-18: left obturator internus steroid inj.\par \par IE 10-11-18\par Ms. TRACY JACOBS is a 57yo female who had a recent L2-S1 PLIF with Dr. Avelar, presenting today for follow-up of bilateral gluteal pain. She began noticing the pain worsening after her spine surgery and thinks it may be related to increased time spent sitting/lying down while recovering.  She continues to describe the pain as worse with sitting and radiating to left groin and lateral thighs. Ms. Jacobs describes the pain as feeling like she has just completed a vigorous workout, though she is not currently doing any significant exercise due to recent surgery.  She continues to deny LE weakness, numbness and/or tingling, or bowel/bladder incontinence. \par \par \par Pain Description: constant soreness bilateral buttocks\par Pain Scale: 9/10 with sitting\par Relieving factors include getting up from seated position. \par Bowel/Bladder Incontinence: Denies\par Lower extremity weakness: Denies\par --------------------------------------------------------\par Imaging Review\par MRI LS Spine 8-22-18 performed at [facility]:\par Significant improvement in diameter of sinal canal at L2-3 and L4-5 levels. Mild foraminal narrowing L2-3, L4-5 levels. Very small right parasagittal disc herniation at T12-L1 level similar to prior study. \par \par Ultrasound of left short external rotators.\par Specifically tender over left ischium where cortical irregularity / avusion / enthesopathy noted where obturator internus courses. Tenderness to sonopalpation over the insertion of the short external rotator tendons to the posterior GT attachment sites. \par \par "PROCEDURE DATE: 10/08/2018 \par \par INTERPRETATION: \par LEFT HIP MRI \par \par CLINICAL INDICATION: Bilateral gluteal pain. Radiating to the left groin and \par lateral thighs. \par TECHNIQUE: Multiplanar, multisequence MRI was obtained of the pelvis/left \par hip. \par \par FINDINGS: \par \par JOINT: There is mild right hip joint space narrowing. There is no \par full-thickness chondral defect. There is a trace right hip joint effusion. \par MUSCLE AND TENDONS: There is no isolated muscle edema or atrophy. There is \par bilateral peritendinosis at the insertion of the gluteus medius and minimus \par tendons with enthesopathy and low-grade focal partial-thickness tearing at \par the insertion of the right gluteus medius tendon with focal cystic change \par within the adjacent trochanter. Small amount of fluid is visualized within \par the right greater trochanter bursa, consistent with bursitis. There is \par low-grade partial-thickness tearing at the origin of the left hamstring \par tendons. Trace edema is visualized within the left obturator internus as it \par courses over the left ischial tuberosity (series 3, image 23). The iliopsoas \par and adductors are intact. \par BONE: There is no osseous edema, fracture or osseous necrosis. Lumbar \par instrumentation hardware is visualized with associated susceptibility \par artifact. Mild degenerative change of the bilateral sacroiliac joints with \par focal area of subchondral edema within the anterior inferior right \par sacroiliac articulation. Pubic symphysis is intact. \par NEUROVASCULAR STRUCTURES: Course the neurovascular structures is \par unremarkable. \par INTRAPELVIC AND PERIPHERAL SOFT TISSUES: Small uterine leiomyomas. \par \par IMPRESSION: \par Bilateral gluteus minimus and medius peritendinosis with low-grade \par partial-thickness tearing enthesopathy at the insertion of the right gluteus \par medius tendon. Right greater trochanteric bursitis. Low-grade \par partial-thickness tearing at the origin of the left hamstring tendons. \par Mild right hip joint space narrowing and trace right hip joint effusion "\par \par

## 2019-08-29 NOTE — ASSESSMENT
[FreeTextEntry1] : CESAR JACOBS,  a 58 year-old female here for follow up for bilateral gluteal pain, right worse than left.  \par \par RIGHT HIP:\par 1. Partial-thickness tearing enthesopathy at the insertion of the right gluteus medius tendon. Right greater trochanteric bursitis. \par 2. SIJ pain (improved after US guided injection b/w PSIS and sacrum).\par \par Plan:\par 1. Performed ultrasound guided bursa injection for R trochanteric bursitis\par 2. For SI joint pain - will schedule for repeat fluoroscopic guided SIJ injection since had improvements in the past\par 3. MRI Lumbar spine to evaluate adjacent segment disease\par 4. FU SI joint injection\par \par Medications have been discussed and reconciled, adverse reactions and side effects have been reviewed.\par \par

## 2019-10-15 ENCOUNTER — RX RENEWAL (OUTPATIENT)
Age: 59
End: 2019-10-15

## 2019-10-17 ENCOUNTER — RX RENEWAL (OUTPATIENT)
Age: 59
End: 2019-10-17

## 2019-10-17 ENCOUNTER — OTHER (OUTPATIENT)
Age: 59
End: 2019-10-17

## 2019-10-23 ENCOUNTER — RX RENEWAL (OUTPATIENT)
Age: 59
End: 2019-10-23

## 2019-12-02 ENCOUNTER — RX RENEWAL (OUTPATIENT)
Age: 59
End: 2019-12-02

## 2019-12-02 RX ORDER — DICLOFENAC SODIUM 75 MG/1
75 TABLET, DELAYED RELEASE ORAL
Qty: 60 | Refills: 0 | Status: ACTIVE | COMMUNITY
Start: 2019-02-19 | End: 1900-01-01

## 2019-12-17 ENCOUNTER — OUTPATIENT (OUTPATIENT)
Dept: OUTPATIENT SERVICES | Facility: HOSPITAL | Age: 59
LOS: 1 days | End: 2019-12-17
Payer: COMMERCIAL

## 2019-12-17 ENCOUNTER — APPOINTMENT (OUTPATIENT)
Dept: PHYSICAL MEDICINE AND REHAB | Facility: CLINIC | Age: 59
End: 2019-12-17

## 2019-12-17 DIAGNOSIS — Z98.890 OTHER SPECIFIED POSTPROCEDURAL STATES: Chronic | ICD-10-CM

## 2019-12-17 DIAGNOSIS — M53.3 SACROCOCCYGEAL DISORDERS, NOT ELSEWHERE CLASSIFIED: ICD-10-CM

## 2019-12-17 DIAGNOSIS — M47.816 SPONDYLOSIS W/OUT MYELOPATHY OR RADICULOPATHY, LUMBAR REGION: ICD-10-CM

## 2019-12-17 PROCEDURE — 27096 INJECT SACROILIAC JOINT: CPT | Mod: 50

## 2019-12-17 PROCEDURE — G0260: CPT

## 2019-12-19 DIAGNOSIS — M46.1 SACROILIITIS, NOT ELSEWHERE CLASSIFIED: ICD-10-CM

## 2019-12-19 PROBLEM — M53.3 SACROILIAC PAIN: Status: ACTIVE | Noted: 2018-10-28

## 2019-12-19 PROBLEM — M47.816 LUMBAR SPONDYLOSIS: Status: ACTIVE | Noted: 2018-04-09

## 2020-01-07 ENCOUNTER — APPOINTMENT (OUTPATIENT)
Dept: PHYSICAL MEDICINE AND REHAB | Facility: CLINIC | Age: 60
End: 2020-01-07

## 2021-08-03 ENCOUNTER — APPOINTMENT (OUTPATIENT)
Dept: ANESTHESIOLOGY | Facility: CLINIC | Age: 61
End: 2021-08-03

## 2021-08-03 ENCOUNTER — OUTPATIENT (OUTPATIENT)
Dept: OUTPATIENT SERVICES | Facility: HOSPITAL | Age: 61
LOS: 1 days | End: 2021-08-03
Payer: COMMERCIAL

## 2021-08-03 DIAGNOSIS — Z98.890 OTHER SPECIFIED POSTPROCEDURAL STATES: Chronic | ICD-10-CM

## 2021-08-03 DIAGNOSIS — M54.14 RADICULOPATHY, THORACIC REGION: ICD-10-CM

## 2021-08-03 PROCEDURE — 62323 NJX INTERLAMINAR LMBR/SAC: CPT

## 2021-08-20 ENCOUNTER — APPOINTMENT (OUTPATIENT)
Dept: ANESTHESIOLOGY | Facility: CLINIC | Age: 61
End: 2021-08-20

## 2021-08-20 ENCOUNTER — OUTPATIENT (OUTPATIENT)
Dept: OUTPATIENT SERVICES | Facility: HOSPITAL | Age: 61
LOS: 1 days | End: 2021-08-20
Payer: COMMERCIAL

## 2021-08-20 DIAGNOSIS — M46.1 SACROILIITIS, NOT ELSEWHERE CLASSIFIED: ICD-10-CM

## 2021-08-20 DIAGNOSIS — Z98.890 OTHER SPECIFIED POSTPROCEDURAL STATES: Chronic | ICD-10-CM

## 2021-08-20 DIAGNOSIS — M54.16 RADICULOPATHY, LUMBAR REGION: ICD-10-CM

## 2021-08-20 PROCEDURE — G0260: CPT

## 2021-11-12 ENCOUNTER — APPOINTMENT (OUTPATIENT)
Dept: ANESTHESIOLOGY | Facility: CLINIC | Age: 61
End: 2021-11-12

## 2021-12-07 ENCOUNTER — APPOINTMENT (OUTPATIENT)
Dept: ANESTHESIOLOGY | Facility: CLINIC | Age: 61
End: 2021-12-07

## 2021-12-07 ENCOUNTER — OUTPATIENT (OUTPATIENT)
Dept: OUTPATIENT SERVICES | Facility: HOSPITAL | Age: 61
LOS: 1 days | End: 2021-12-07
Payer: COMMERCIAL

## 2021-12-07 DIAGNOSIS — M54.16 RADICULOPATHY, LUMBAR REGION: ICD-10-CM

## 2021-12-07 DIAGNOSIS — Z98.890 OTHER SPECIFIED POSTPROCEDURAL STATES: Chronic | ICD-10-CM

## 2021-12-07 PROCEDURE — 62323 NJX INTERLAMINAR LMBR/SAC: CPT

## 2022-03-07 ENCOUNTER — APPOINTMENT (OUTPATIENT)
Dept: SPINE | Facility: CLINIC | Age: 62
End: 2022-03-07

## 2022-04-04 ENCOUNTER — APPOINTMENT (OUTPATIENT)
Dept: SPINE | Facility: CLINIC | Age: 62
End: 2022-04-04

## 2023-01-19 NOTE — DISCHARGE NOTE ADULT - PROVIDER RX CONTACT NUMBER
[Fatigue] : fatigue [Poor Appetite] : poor appetite [Wheezing] : wheezing [SOB on Exertion] : sob on exertion [Negative] : Endocrine [phone]

## 2023-12-27 ENCOUNTER — APPOINTMENT (OUTPATIENT)
Dept: SPINE | Facility: CLINIC | Age: 63
End: 2023-12-27